# Patient Record
Sex: MALE | Race: WHITE | NOT HISPANIC OR LATINO
[De-identification: names, ages, dates, MRNs, and addresses within clinical notes are randomized per-mention and may not be internally consistent; named-entity substitution may affect disease eponyms.]

---

## 2017-04-25 ENCOUNTER — RX RENEWAL (OUTPATIENT)
Age: 77
End: 2017-04-25

## 2017-05-18 ENCOUNTER — APPOINTMENT (OUTPATIENT)
Dept: HEART AND VASCULAR | Facility: CLINIC | Age: 77
End: 2017-05-18

## 2017-05-18 VITALS
BODY MASS INDEX: 24.62 KG/M2 | OXYGEN SATURATION: 95 % | DIASTOLIC BLOOD PRESSURE: 64 MMHG | HEART RATE: 72 BPM | HEIGHT: 70 IN | WEIGHT: 172 LBS | SYSTOLIC BLOOD PRESSURE: 120 MMHG

## 2017-05-19 LAB
25(OH)D3 SERPL-MCNC: 44.1 NG/ML
ALBUMIN SERPL ELPH-MCNC: 4.5 G/DL
ALP BLD-CCNC: 67 U/L
ALT SERPL-CCNC: 35 U/L
ANION GAP SERPL CALC-SCNC: 16 MMOL/L
AST SERPL-CCNC: 34 U/L
BASOPHILS # BLD AUTO: 0.03 K/UL
BASOPHILS NFR BLD AUTO: 0.5 %
BILIRUB SERPL-MCNC: 0.8 MG/DL
BUN SERPL-MCNC: 19 MG/DL
CALCIUM SERPL-MCNC: 9.3 MG/DL
CHLORIDE SERPL-SCNC: 103 MMOL/L
CHOLEST SERPL-MCNC: 173 MG/DL
CHOLEST/HDLC SERPL: 3.4 RATIO
CO2 SERPL-SCNC: 22 MMOL/L
CREAT SERPL-MCNC: 0.93 MG/DL
CRP SERPL HS-MCNC: 0.5 MG/L
EOSINOPHIL # BLD AUTO: 0.2 K/UL
EOSINOPHIL NFR BLD AUTO: 3.4 %
GLUCOSE SERPL-MCNC: 93 MG/DL
HBA1C MFR BLD HPLC: 5.3 %
HCT VFR BLD CALC: 46.9 %
HCYS SERPL-MCNC: 9.4 UMOL/L
HDLC SERPL-MCNC: 51 MG/DL
HGB BLD-MCNC: 15.1 G/DL
IMM GRANULOCYTES NFR BLD AUTO: 0.2 %
LDLC SERPL CALC-MCNC: 107 MG/DL
LYMPHOCYTES # BLD AUTO: 1.76 K/UL
LYMPHOCYTES NFR BLD AUTO: 29.5 %
MAN DIFF?: NORMAL
MCHC RBC-ENTMCNC: 30.3 PG
MCHC RBC-ENTMCNC: 32.2 GM/DL
MCV RBC AUTO: 94.2 FL
MONOCYTES # BLD AUTO: 0.46 K/UL
MONOCYTES NFR BLD AUTO: 7.7 %
NEUTROPHILS # BLD AUTO: 3.5 K/UL
NEUTROPHILS NFR BLD AUTO: 58.7 %
PLATELET # BLD AUTO: 224 K/UL
POTASSIUM SERPL-SCNC: 4.4 MMOL/L
PROT SERPL-MCNC: 6.5 G/DL
PSA SERPL-MCNC: 1.45 NG/ML
RBC # BLD: 4.98 M/UL
RBC # FLD: 14.2 %
SODIUM SERPL-SCNC: 141 MMOL/L
TRIGL SERPL-MCNC: 74 MG/DL
TSH SERPL-ACNC: 4.01 UIU/ML
WBC # FLD AUTO: 5.96 K/UL

## 2017-08-14 ENCOUNTER — RX RENEWAL (OUTPATIENT)
Age: 77
End: 2017-08-14

## 2017-09-18 ENCOUNTER — APPOINTMENT (OUTPATIENT)
Dept: HEART AND VASCULAR | Facility: CLINIC | Age: 77
End: 2017-09-18
Payer: MEDICARE

## 2017-09-18 VITALS
TEMPERATURE: 98.2 F | OXYGEN SATURATION: 98 % | WEIGHT: 178 LBS | HEIGHT: 70 IN | RESPIRATION RATE: 14 BRPM | BODY MASS INDEX: 25.48 KG/M2 | HEART RATE: 59 BPM | SYSTOLIC BLOOD PRESSURE: 130 MMHG | DIASTOLIC BLOOD PRESSURE: 70 MMHG

## 2017-09-18 PROCEDURE — 93015 CV STRESS TEST SUPVJ I&R: CPT

## 2017-09-18 PROCEDURE — 99215 OFFICE O/P EST HI 40 MIN: CPT | Mod: 25

## 2017-09-18 PROCEDURE — 93306 TTE W/DOPPLER COMPLETE: CPT | Mod: XE

## 2017-09-18 PROCEDURE — 93880 EXTRACRANIAL BILAT STUDY: CPT | Mod: XE

## 2017-11-07 ENCOUNTER — RX RENEWAL (OUTPATIENT)
Age: 77
End: 2017-11-07

## 2018-01-31 ENCOUNTER — RX RENEWAL (OUTPATIENT)
Age: 78
End: 2018-01-31

## 2018-02-02 ENCOUNTER — RX RENEWAL (OUTPATIENT)
Age: 78
End: 2018-02-02

## 2018-05-07 ENCOUNTER — RX RENEWAL (OUTPATIENT)
Age: 78
End: 2018-05-07

## 2018-05-15 ENCOUNTER — RX RENEWAL (OUTPATIENT)
Age: 78
End: 2018-05-15

## 2018-07-31 ENCOUNTER — RX RENEWAL (OUTPATIENT)
Age: 78
End: 2018-07-31

## 2018-08-01 ENCOUNTER — RX RENEWAL (OUTPATIENT)
Age: 78
End: 2018-08-01

## 2018-10-10 ENCOUNTER — APPOINTMENT (OUTPATIENT)
Dept: HEART AND VASCULAR | Facility: CLINIC | Age: 78
End: 2018-10-10

## 2018-10-10 VITALS
TEMPERATURE: 98.7 F | HEART RATE: 66 BPM | HEIGHT: 70 IN | BODY MASS INDEX: 24.34 KG/M2 | RESPIRATION RATE: 14 BRPM | SYSTOLIC BLOOD PRESSURE: 114 MMHG | DIASTOLIC BLOOD PRESSURE: 70 MMHG | WEIGHT: 170 LBS | OXYGEN SATURATION: 95 %

## 2018-10-29 ENCOUNTER — RX RENEWAL (OUTPATIENT)
Age: 78
End: 2018-10-29

## 2018-12-28 ENCOUNTER — RX RENEWAL (OUTPATIENT)
Age: 78
End: 2018-12-28

## 2019-01-29 ENCOUNTER — MESSAGE (OUTPATIENT)
Age: 79
End: 2019-01-29

## 2019-01-30 ENCOUNTER — RX RENEWAL (OUTPATIENT)
Age: 79
End: 2019-01-30

## 2019-03-20 ENCOUNTER — APPOINTMENT (OUTPATIENT)
Dept: HEART AND VASCULAR | Facility: CLINIC | Age: 79
End: 2019-03-20

## 2019-03-27 ENCOUNTER — APPOINTMENT (OUTPATIENT)
Dept: HEART AND VASCULAR | Facility: CLINIC | Age: 79
End: 2019-03-27
Payer: MEDICARE

## 2019-03-27 VITALS
HEART RATE: 67 BPM | WEIGHT: 172.8 LBS | HEIGHT: 70 IN | OXYGEN SATURATION: 97 % | DIASTOLIC BLOOD PRESSURE: 60 MMHG | RESPIRATION RATE: 14 BRPM | SYSTOLIC BLOOD PRESSURE: 105 MMHG | BODY MASS INDEX: 24.74 KG/M2

## 2019-03-27 PROCEDURE — 99214 OFFICE O/P EST MOD 30 MIN: CPT

## 2019-03-27 PROCEDURE — 93306 TTE W/DOPPLER COMPLETE: CPT

## 2019-03-27 PROCEDURE — 93880 EXTRACRANIAL BILAT STUDY: CPT

## 2019-03-27 PROCEDURE — 93000 ELECTROCARDIOGRAM COMPLETE: CPT

## 2019-03-27 RX ORDER — AMLODIPINE BESYLATE 2.5 MG/1
2.5 TABLET ORAL
Refills: 0 | Status: DISCONTINUED | COMMUNITY
End: 2019-03-27

## 2019-03-27 RX ORDER — ALFUZOSIN HYDROCHLORIDE 10 MG/1
10 TABLET, EXTENDED RELEASE ORAL DAILY
Qty: 1 | Refills: 3 | Status: DISCONTINUED | COMMUNITY
End: 2019-03-27

## 2019-03-27 RX ORDER — AMLODIPINE BESYLATE 10 MG/1
10 TABLET ORAL
Qty: 90 | Refills: 1 | Status: DISCONTINUED | COMMUNITY
Start: 2018-05-15 | End: 2019-03-27

## 2019-03-27 NOTE — DISCUSSION/SUMMARY
[___ Month(s)] : [unfilled] month(s) [With Me] : with me [FreeTextEntry3] : fasting lipids, cbc and treadmill stress [FreeTextEntry1] : results and labs discussed\par hgb likely low due to recent blood donation - repeat in few months\par lipiids up- restart pravastatin\par

## 2019-03-27 NOTE — HISTORY OF PRESENT ILLNESS
[FreeTextEntry1] : feels well. but not exercising as much\par No palps. \par trigeminal neuralgia improved\par stopped amlodapine due to lightheaded and low bp, better now\par c/o urinary frequency- prostate related\par no angina or dyspnea

## 2019-03-27 NOTE — PHYSICAL EXAM
[General Appearance - Well Developed] : well developed [Normal Appearance] : normal appearance [Well Groomed] : well groomed [General Appearance - Well Nourished] : well nourished [No Deformities] : no deformities [General Appearance - In No Acute Distress] : no acute distress [Normal Conjunctiva] : the conjunctiva exhibited no abnormalities [Eyelids - No Xanthelasma] : the eyelids demonstrated no xanthelasmas [Normal Oral Mucosa] : normal oral mucosa [No Oral Pallor] : no oral pallor [No Oral Cyanosis] : no oral cyanosis [Normal Jugular Venous A Waves Present] : normal jugular venous A waves present [Normal Jugular Venous V Waves Present] : normal jugular venous V waves present [No Jugular Venous Luu A Waves] : no jugular venous luu A waves [Respiration, Rhythm And Depth] : normal respiratory rhythm and effort [Exaggerated Use Of Accessory Muscles For Inspiration] : no accessory muscle use [Auscultation Breath Sounds / Voice Sounds] : lungs were clear to auscultation bilaterally [Heart Rate And Rhythm] : heart rate and rhythm were normal [Heart Sounds] : normal S1 and S2 [Murmurs] : no murmurs present [Abdomen Soft] : soft [Abdomen Tenderness] : non-tender [Abdomen Mass (___ Cm)] : no abdominal mass palpated [Abnormal Walk] : normal gait [Gait - Sufficient For Exercise Testing] : the gait was sufficient for exercise testing [Nail Clubbing] : no clubbing of the fingernails [Cyanosis, Localized] : no localized cyanosis [Petechial Hemorrhages (___cm)] : no petechial hemorrhages [Skin Color & Pigmentation] : normal skin color and pigmentation [] : no rash [No Venous Stasis] : no venous stasis [Skin Lesions] : no skin lesions [No Skin Ulcers] : no skin ulcer [No Xanthoma] : no  xanthoma was observed [Oriented To Time, Place, And Person] : oriented to person, place, and time [Affect] : the affect was normal [Mood] : the mood was normal [No Anxiety] : not feeling anxious [FreeTextEntry1] : 1-2/6 cornell

## 2019-04-15 ENCOUNTER — APPOINTMENT (OUTPATIENT)
Dept: INTERNAL MEDICINE | Facility: CLINIC | Age: 79
End: 2019-04-15
Payer: MEDICARE

## 2019-04-15 VITALS
RESPIRATION RATE: 14 BRPM | DIASTOLIC BLOOD PRESSURE: 60 MMHG | WEIGHT: 173 LBS | SYSTOLIC BLOOD PRESSURE: 110 MMHG | HEART RATE: 62 BPM | TEMPERATURE: 98.8 F | OXYGEN SATURATION: 96 % | BODY MASS INDEX: 24.82 KG/M2

## 2019-04-15 PROCEDURE — 99214 OFFICE O/P EST MOD 30 MIN: CPT

## 2019-04-18 ENCOUNTER — FORM ENCOUNTER (OUTPATIENT)
Age: 79
End: 2019-04-18

## 2019-04-19 ENCOUNTER — OUTPATIENT (OUTPATIENT)
Dept: OUTPATIENT SERVICES | Facility: HOSPITAL | Age: 79
LOS: 1 days | End: 2019-04-19
Payer: MEDICARE

## 2019-04-19 ENCOUNTER — APPOINTMENT (OUTPATIENT)
Dept: RADIOLOGY | Facility: CLINIC | Age: 79
End: 2019-04-19

## 2019-04-19 ENCOUNTER — APPOINTMENT (OUTPATIENT)
Dept: PHYSICAL MEDICINE AND REHAB | Facility: CLINIC | Age: 79
End: 2019-04-19
Payer: MEDICARE

## 2019-04-19 VITALS
OXYGEN SATURATION: 98 % | BODY MASS INDEX: 24.77 KG/M2 | HEART RATE: 67 BPM | HEIGHT: 70 IN | WEIGHT: 173 LBS | RESPIRATION RATE: 16 BRPM

## 2019-04-19 DIAGNOSIS — M19.012 PRIMARY OSTEOARTHRITIS, LEFT SHOULDER: ICD-10-CM

## 2019-04-19 PROCEDURE — 73030 X-RAY EXAM OF SHOULDER: CPT

## 2019-04-19 PROCEDURE — 99203 OFFICE O/P NEW LOW 30 MIN: CPT

## 2019-04-19 PROCEDURE — 73030 X-RAY EXAM OF SHOULDER: CPT | Mod: 26,LT

## 2019-04-21 NOTE — HISTORY OF PRESENT ILLNESS
[FreeTextEntry1] : New doctor visit [de-identified] : THis is a 79 yo M - patient of Dr Cartwright- needs PMD now.  Former patient of Dr Qiuncy Lockett. \par Hx of aaa, remains stable., MRm HTN. HLD\par Eats well but does not exercise a lot right now (plans to get back into it)\par BPH - saw Dr Griffin - prostate mildly enlarged.  Uroxatral tried it but bp went low.  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  \par Hx of left shoulder pain - surgery at [facility] years ago - Pain in bed lying on side.  \par Seasonal allergies -worse at night. Had seen ENT Dr Pena- advised to stop afrin in the past.  SHe rec a procedure - seeing her tomorrow\par Tearing in left eye - Sw optho Dr Durand seen 2 weeks ago - given drops....did not help.

## 2019-04-21 NOTE — PHYSICAL EXAM
[de-identified] : slight watering left eye, no erthema [de-identified] : 2mm round brownflat spotright finger

## 2019-04-22 ENCOUNTER — MESSAGE (OUTPATIENT)
Age: 79
End: 2019-04-22

## 2019-05-14 ENCOUNTER — RX RENEWAL (OUTPATIENT)
Age: 79
End: 2019-05-14

## 2019-06-24 ENCOUNTER — RX RENEWAL (OUTPATIENT)
Age: 79
End: 2019-06-24

## 2019-06-25 ENCOUNTER — APPOINTMENT (OUTPATIENT)
Dept: NEUROLOGY | Facility: CLINIC | Age: 79
End: 2019-06-25
Payer: MEDICARE

## 2019-06-25 VITALS
HEART RATE: 75 BPM | WEIGHT: 175 LBS | BODY MASS INDEX: 25.05 KG/M2 | SYSTOLIC BLOOD PRESSURE: 102 MMHG | DIASTOLIC BLOOD PRESSURE: 67 MMHG | HEIGHT: 70 IN | OXYGEN SATURATION: 96 %

## 2019-06-25 PROCEDURE — 99204 OFFICE O/P NEW MOD 45 MIN: CPT

## 2019-06-25 NOTE — PHYSICAL EXAM
[FreeTextEntry1] : Exam:\par Cards- RRR, distal pulses intact \par Gen- awake, alert, NAD\par MS- oriented x3, able to follow commands, speech fluent and nondysarthric\par CN- PERRL, EOMI without nystag, face symmetrical, v1-3 intact, tongue and uvula midline, shoulder shrug intact\par M- R,L (5,5)\par UE: Shoulder abduction (5,4)- chronic L shoulder issue with prior surgery and known OA\par Elbow flexion (5,5)\par Elbow extension (5,5)\par Wrist flexion (5,5)\par Wrist extension (5,5)\par  (5,5)\par LE:\par Hip flexion (5,5)\par Knee flexion (5,5)\par Knee extension (5,5)\par Dorsiflexion (5,5)\par Plantarflexion (5,5)\par Reflexes: R, L (2+, 2+)\par Biceps: (2+, 2+)\par Triceps (2+, 2+)\par BR (2+, 2+)\par Patellars (2+, 2+)\par AJ (1+, 1+)\par Toes: downgoing bilat\par Sensory:\par LT intact bilat\par Coord- FTN intact bilat, finger tapping symmetrical\par Gait- narrow based, steady gait\par \par \par

## 2019-06-25 NOTE — DISCUSSION/SUMMARY
[FreeTextEntry1] : L facial tingling and itching possibly trigeminal neuralgia with reportedly normal workup in the past.  Symptoms have nearly if not completely resolved, although he remains on gabapentin\par \par Advised him to taper off gabapentin over next 3 weeks.  If symptoms return, then he can return back to current dose 600-900-900 and follow up in clinic with Dr. Burk, Dr. Crystal or Dr. Wetzel.  If they do not return then he does not need to follow up\par \par

## 2019-06-25 NOTE — HISTORY OF PRESENT ILLNESS
[FreeTextEntry1] : 78 year old M, referred by Dr. Jay for L facial tingling and itching involving the ear, cheek and into the L neck, as per records was given dx of "trigeminal neuralgia". Symptoms started about 3 years ago.  Was started on gabapentin which provided relief.  Symptoms have mostly resolved over a year ago.  Occasional gets itching of the neck but isnt sure if its related.  Reports 95+ percent improvement.  However remains on gabapentin and would like to try to stop it.  On gabapentin 600-900-900 daily.  \par \par Reports having had MR brain and c spine which was nondiagnostic, ordered by his prior neurologist Dr. Kenney.  Records not available\par \par PMHx\par mitral insuff\par carotid atherosclerosis\par ascending aortic aneurysm, stable for 20 years\par HLD\par HTN\par tinnitus since '84\par OA L shoulder\par \par Social Hx\par never smoked\par no alc since '84\par no drugs\par \par FHx\par father- HTN, strokes\par mother- HTN\par \par ROS- negative except as listed in HPI\par \par \par \par

## 2019-07-24 ENCOUNTER — RX RENEWAL (OUTPATIENT)
Age: 79
End: 2019-07-24

## 2019-08-16 ENCOUNTER — RX RENEWAL (OUTPATIENT)
Age: 79
End: 2019-08-16

## 2019-09-18 ENCOUNTER — RX RENEWAL (OUTPATIENT)
Age: 79
End: 2019-09-18

## 2019-10-04 ENCOUNTER — EMERGENCY (EMERGENCY)
Facility: HOSPITAL | Age: 79
LOS: 1 days | Discharge: HOME | End: 2019-10-04
Attending: EMERGENCY MEDICINE | Admitting: EMERGENCY MEDICINE
Payer: MEDICARE

## 2019-10-04 VITALS
WEIGHT: 119.93 LBS | RESPIRATION RATE: 18 BRPM | HEART RATE: 63 BPM | OXYGEN SATURATION: 98 % | TEMPERATURE: 98 F | DIASTOLIC BLOOD PRESSURE: 65 MMHG | HEIGHT: 70 IN | SYSTOLIC BLOOD PRESSURE: 134 MMHG

## 2019-10-04 VITALS
RESPIRATION RATE: 18 BRPM | DIASTOLIC BLOOD PRESSURE: 76 MMHG | OXYGEN SATURATION: 96 % | TEMPERATURE: 98 F | SYSTOLIC BLOOD PRESSURE: 143 MMHG | HEART RATE: 46 BPM

## 2019-10-04 PROCEDURE — 99284 EMERGENCY DEPT VISIT MOD MDM: CPT | Mod: 25

## 2019-10-04 PROCEDURE — 36415 COLL VENOUS BLD VENIPUNCTURE: CPT

## 2019-10-04 PROCEDURE — 99285 EMERGENCY DEPT VISIT HI MDM: CPT

## 2019-10-04 PROCEDURE — 71045 X-RAY EXAM CHEST 1 VIEW: CPT | Mod: 26

## 2019-10-04 PROCEDURE — 80053 COMPREHEN METABOLIC PANEL: CPT

## 2019-10-04 PROCEDURE — 85730 THROMBOPLASTIN TIME PARTIAL: CPT

## 2019-10-04 PROCEDURE — 85025 COMPLETE CBC W/AUTO DIFF WBC: CPT

## 2019-10-04 PROCEDURE — 71045 X-RAY EXAM CHEST 1 VIEW: CPT

## 2019-10-04 PROCEDURE — 93005 ELECTROCARDIOGRAM TRACING: CPT | Mod: 76

## 2019-10-04 PROCEDURE — 82553 CREATINE MB FRACTION: CPT

## 2019-10-04 PROCEDURE — 82550 ASSAY OF CK (CPK): CPT

## 2019-10-04 PROCEDURE — 84484 ASSAY OF TROPONIN QUANT: CPT

## 2019-10-04 PROCEDURE — 85610 PROTHROMBIN TIME: CPT

## 2019-10-04 PROCEDURE — 93010 ELECTROCARDIOGRAM REPORT: CPT

## 2019-10-04 NOTE — ED PROVIDER NOTE - PHYSICAL EXAMINATION
Constitutional: Well appearing, well nourished, awake, alert, oriented to person, place, time/situation and in no apparent distress.  ENMT: Airway patent. Normal MM  Eyes: Clear bilaterally  Cardiac: Normal rate, regular rhythm.  Heart sounds S1, S2.  No murmurs, rubs or gallops. No JVD or LE edema  Respiratory: Breaths sounds equal and clear b/l. No W/R/R. No increased WOB, tachypnea, hypoxia, or accessory mm use. Pt speaks in full sentences.   Gastrointestinal: Abd soft, NT, ND, NABS. No guarding, rebound, or rigidity. No pulsatile abdominal masses. No organomegaly appreciated. No CVAT   Musculoskeletal: Range of motion is not limited  Neuro: Alert and oriented x 3, face symmetric and speech fluent. Strength 5/5 x 4 ext and symmetric, nml gross motor movement, nml gait. No focal deficits noted.  Skin: Skin normal color for race, warm, dry and intact. No evidence of rash.  Psych: Alert and oriented to person, place, time/situation. normal mood and affect. no apparent risk to self or others.

## 2019-10-04 NOTE — ED ADULT NURSE NOTE - CHPI ED NUR SYMPTOMS NEG
no diaphoresis/no shortness of breath/no fever/no chest pain/no back pain/no nausea/no syncope/no chills/no dizziness/no congestion/no vomiting

## 2019-10-04 NOTE — ED ADULT NURSE NOTE - OBJECTIVE STATEMENT
PT sent to ED by PMD for irregular heartbeat. Pt has hx of mitral valve regurgitation and desecending AA.  PT states he has intermittent tachycardia and palpitations.  PT denies chest pain, SOB, abd pain, /GI symptoms, D/N/V, fever or chills.   PT speaking in complete, clear sentences, alert and oriented x3.

## 2019-10-04 NOTE — ED PROVIDER NOTE - PROGRESS NOTE DETAILS
D/w Dr Velazco, covering Dr Cartwright. Pt can call the office on Monday for Holter monitoring. Return precautions given. Will d/c

## 2019-10-04 NOTE — ED PROVIDER NOTE - NSFOLLOWUPINSTRUCTIONS_ED_ALL_ED_FT
You were evaluated in the ED for palpitations. Your blood work, EKG, and xray of the chest did not reveal any acute abnormalities. You were monitoring in the ED without arrhythmia. Your heart remained in the 50s most of the visit. Please call the office of your cardiologist on Monday to set you on for Holter monitoring. Return for high heart rates > 100, or low < 40, and /or symptoms of chest pain, shortness of breath, feeling faint with the palpitations.     Palpitations    A palpitation is the feeling that your heartbeat is irregular or is faster than normal. It may feel like your heart is fluttering or skipping a beat. They may be caused by many things, including smoking, caffeine, alcohol, stress, and certain medicines. Although most causes of palpitations are not serious, palpitations can be a sign of a serious medical problem. Avoid caffeine, alcohol, and tobacco products at home. Try to reduce stress and anxiety and make sure to get plenty of rest.     SEEK IMMEDIATE MEDICAL CARE IF YOU HAVE ANY OF THE FOLLOWING SYMPTOMS: chest pain, shortness of breath, severe headache, dizziness/lightheadedness, or fainting.

## 2019-10-04 NOTE — ED ADULT TRIAGE NOTE - ARRIVAL INFO ADDITIONAL COMMENTS
states "I feel like my heart is skipping for past hour". states he has hx of PVC. denies any chest pain, cough, sob, fever/chills

## 2019-10-04 NOTE — ED PROVIDER NOTE - OBJECTIVE STATEMENT
Pt w/ PMHx mitral valve regurg, HTN, BPH, palpitations, stable aortic aneurysm @ 4.5 cm, p/w intermittent palpitations x days. Pt reports the other day he had approx 15 min of palpitations, described as "tachycardia." Sx were self limited and resolved. Today, since 3 pm, pt has had several episodes of palpitations described as extra beats. Pt reports he normally has PACs, and can feel them at times, but this week more profound. No associated CP, SOB, diaphoresis, lightheadedness, or syncope. Since being in the ED, pt states he feels "back to normal." Cardiologist is Dr Cartwright

## 2019-10-04 NOTE — ED PROVIDER NOTE - CLINICAL SUMMARY MEDICAL DECISION MAKING FREE TEXT BOX
Pt p/w intermittent palpitations, improved on arrival. No associated sx. SB w/ occasional PACs on EKG. DDx includes but not limited to dehydration, electrolyte abnormalities, anemia, paroxsymal arrhythmia, thyroid dysfunction, other pathology. Monitor in ED. Check labs, CXR. If no arrhythmia and w/u stable, anticipate d/c w/ f/u cardio for Holter monitoring

## 2019-10-04 NOTE — ED PROVIDER NOTE - PATIENT PORTAL LINK FT
You can access the FollowMyHealth Patient Portal offered by [facility] by registering at the following website: [email or web address]. By joining [facility]’s FollowMyHealth portal, you will also be able to view your health information using other applications (apps) compatible with our system.

## 2019-10-08 ENCOUNTER — APPOINTMENT (OUTPATIENT)
Dept: HEART AND VASCULAR | Facility: CLINIC | Age: 79
End: 2019-10-08
Payer: MEDICARE

## 2019-10-08 VITALS
DIASTOLIC BLOOD PRESSURE: 58 MMHG | WEIGHT: 175 LBS | RESPIRATION RATE: 16 BRPM | SYSTOLIC BLOOD PRESSURE: 116 MMHG | HEART RATE: 60 BPM | OXYGEN SATURATION: 97 % | BODY MASS INDEX: 25.05 KG/M2 | TEMPERATURE: 97.9 F | HEIGHT: 70 IN

## 2019-10-08 PROBLEM — I10 ESSENTIAL (PRIMARY) HYPERTENSION: Chronic | Status: ACTIVE | Noted: 2019-10-04

## 2019-10-08 PROBLEM — I34.0 NONRHEUMATIC MITRAL (VALVE) INSUFFICIENCY: Chronic | Status: ACTIVE | Noted: 2019-10-04

## 2019-10-08 PROBLEM — I71.9 AORTIC ANEURYSM OF UNSPECIFIED SITE, WITHOUT RUPTURE: Chronic | Status: ACTIVE | Noted: 2019-10-04

## 2019-10-08 PROCEDURE — 99213 OFFICE O/P EST LOW 20 MIN: CPT

## 2019-10-08 PROCEDURE — 93225 XTRNL ECG REC<48 HRS REC: CPT

## 2019-10-08 NOTE — PHYSICAL EXAM
[General Appearance - Well Developed] : well developed [Normal Appearance] : normal appearance [Well Groomed] : well groomed [General Appearance - Well Nourished] : well nourished [No Deformities] : no deformities [General Appearance - In No Acute Distress] : no acute distress [Abnormal Walk] : normal gait [Oriented To Time, Place, And Person] : oriented to person, place, and time [Affect] : the affect was normal [Mood] : the mood was normal [No Anxiety] : not feeling anxious

## 2019-10-08 NOTE — HISTORY OF PRESENT ILLNESS
[FreeTextEntry1] : 79 year male who comes for Holter hookup after ER visit. He was evaluated for palpitations but denies having syncope or near syncope. He is concerned about A Fib

## 2019-10-11 ENCOUNTER — NON-APPOINTMENT (OUTPATIENT)
Age: 79
End: 2019-10-11

## 2019-10-11 DIAGNOSIS — R00.2 PALPITATIONS: ICD-10-CM

## 2019-10-16 ENCOUNTER — MESSAGE (OUTPATIENT)
Age: 79
End: 2019-10-16

## 2019-10-19 ENCOUNTER — EMERGENCY (EMERGENCY)
Facility: HOSPITAL | Age: 79
LOS: 1 days | Discharge: HOME | End: 2019-10-19
Attending: EMERGENCY MEDICINE | Admitting: EMERGENCY MEDICINE
Payer: MEDICARE

## 2019-10-19 VITALS
RESPIRATION RATE: 18 BRPM | OXYGEN SATURATION: 97 % | TEMPERATURE: 98 F | HEART RATE: 62 BPM | SYSTOLIC BLOOD PRESSURE: 139 MMHG | DIASTOLIC BLOOD PRESSURE: 60 MMHG

## 2019-10-19 VITALS
OXYGEN SATURATION: 97 % | SYSTOLIC BLOOD PRESSURE: 146 MMHG | TEMPERATURE: 99 F | WEIGHT: 169.98 LBS | HEART RATE: 66 BPM | DIASTOLIC BLOOD PRESSURE: 57 MMHG | HEIGHT: 70 IN | RESPIRATION RATE: 16 BRPM

## 2019-10-19 LAB
ALBUMIN SERPL ELPH-MCNC: 4.4 G/DL — SIGNIFICANT CHANGE UP (ref 3.3–5)
ALP SERPL-CCNC: 59 U/L — SIGNIFICANT CHANGE UP (ref 40–120)
ALT FLD-CCNC: 22 U/L — SIGNIFICANT CHANGE UP (ref 10–45)
ANION GAP SERPL CALC-SCNC: 10 MMOL/L — SIGNIFICANT CHANGE UP (ref 5–17)
APTT BLD: 34.2 SEC — SIGNIFICANT CHANGE UP (ref 27.5–36.3)
AST SERPL-CCNC: 26 U/L — SIGNIFICANT CHANGE UP (ref 10–40)
BASOPHILS # BLD AUTO: 0.03 K/UL — SIGNIFICANT CHANGE UP (ref 0–0.2)
BASOPHILS NFR BLD AUTO: 0.6 % — SIGNIFICANT CHANGE UP (ref 0–2)
BILIRUB SERPL-MCNC: 0.3 MG/DL — SIGNIFICANT CHANGE UP (ref 0.2–1.2)
BUN SERPL-MCNC: 13 MG/DL — SIGNIFICANT CHANGE UP (ref 7–23)
CALCIUM SERPL-MCNC: 9.5 MG/DL — SIGNIFICANT CHANGE UP (ref 8.4–10.5)
CHLORIDE SERPL-SCNC: 100 MMOL/L — SIGNIFICANT CHANGE UP (ref 96–108)
CO2 SERPL-SCNC: 29 MMOL/L — SIGNIFICANT CHANGE UP (ref 22–31)
CREAT SERPL-MCNC: 0.75 MG/DL — SIGNIFICANT CHANGE UP (ref 0.5–1.3)
EOSINOPHIL # BLD AUTO: 0.11 K/UL — SIGNIFICANT CHANGE UP (ref 0–0.5)
EOSINOPHIL NFR BLD AUTO: 2.1 % — SIGNIFICANT CHANGE UP (ref 0–6)
GLUCOSE SERPL-MCNC: 101 MG/DL — HIGH (ref 70–99)
HCT VFR BLD CALC: 36.9 % — LOW (ref 39–50)
HGB BLD-MCNC: 11.6 G/DL — LOW (ref 13–17)
IMM GRANULOCYTES NFR BLD AUTO: 0.2 % — SIGNIFICANT CHANGE UP (ref 0–1.5)
INR BLD: 1.14 — SIGNIFICANT CHANGE UP (ref 0.88–1.16)
LYMPHOCYTES # BLD AUTO: 1.03 K/UL — SIGNIFICANT CHANGE UP (ref 1–3.3)
LYMPHOCYTES # BLD AUTO: 19.9 % — SIGNIFICANT CHANGE UP (ref 13–44)
MCHC RBC-ENTMCNC: 26.8 PG — LOW (ref 27–34)
MCHC RBC-ENTMCNC: 31.4 GM/DL — LOW (ref 32–36)
MCV RBC AUTO: 85.2 FL — SIGNIFICANT CHANGE UP (ref 80–100)
MONOCYTES # BLD AUTO: 0.39 K/UL — SIGNIFICANT CHANGE UP (ref 0–0.9)
MONOCYTES NFR BLD AUTO: 7.5 % — SIGNIFICANT CHANGE UP (ref 2–14)
NEUTROPHILS # BLD AUTO: 3.61 K/UL — SIGNIFICANT CHANGE UP (ref 1.8–7.4)
NEUTROPHILS NFR BLD AUTO: 69.7 % — SIGNIFICANT CHANGE UP (ref 43–77)
NRBC # BLD: 0 /100 WBCS — SIGNIFICANT CHANGE UP (ref 0–0)
NT-PROBNP SERPL-SCNC: 156 PG/ML — SIGNIFICANT CHANGE UP (ref 0–300)
PLATELET # BLD AUTO: 198 K/UL — SIGNIFICANT CHANGE UP (ref 150–400)
POTASSIUM SERPL-MCNC: 4.2 MMOL/L — SIGNIFICANT CHANGE UP (ref 3.5–5.3)
POTASSIUM SERPL-SCNC: 4.2 MMOL/L — SIGNIFICANT CHANGE UP (ref 3.5–5.3)
PROT SERPL-MCNC: 6.7 G/DL — SIGNIFICANT CHANGE UP (ref 6–8.3)
PROTHROM AB SERPL-ACNC: 12.9 SEC — SIGNIFICANT CHANGE UP (ref 10–12.9)
RBC # BLD: 4.33 M/UL — SIGNIFICANT CHANGE UP (ref 4.2–5.8)
RBC # FLD: 14.8 % — HIGH (ref 10.3–14.5)
SODIUM SERPL-SCNC: 139 MMOL/L — SIGNIFICANT CHANGE UP (ref 135–145)
TROPONIN T SERPL-MCNC: <0.01 NG/ML — SIGNIFICANT CHANGE UP (ref 0–0.01)
WBC # BLD: 5.18 K/UL — SIGNIFICANT CHANGE UP (ref 3.8–10.5)
WBC # FLD AUTO: 5.18 K/UL — SIGNIFICANT CHANGE UP (ref 3.8–10.5)

## 2019-10-19 PROCEDURE — 84484 ASSAY OF TROPONIN QUANT: CPT

## 2019-10-19 PROCEDURE — 71045 X-RAY EXAM CHEST 1 VIEW: CPT | Mod: 26

## 2019-10-19 PROCEDURE — 83880 ASSAY OF NATRIURETIC PEPTIDE: CPT

## 2019-10-19 PROCEDURE — 80053 COMPREHEN METABOLIC PANEL: CPT

## 2019-10-19 PROCEDURE — 85610 PROTHROMBIN TIME: CPT

## 2019-10-19 PROCEDURE — 36415 COLL VENOUS BLD VENIPUNCTURE: CPT

## 2019-10-19 PROCEDURE — 99284 EMERGENCY DEPT VISIT MOD MDM: CPT | Mod: 25

## 2019-10-19 PROCEDURE — 85730 THROMBOPLASTIN TIME PARTIAL: CPT

## 2019-10-19 PROCEDURE — 99285 EMERGENCY DEPT VISIT HI MDM: CPT | Mod: 25

## 2019-10-19 PROCEDURE — 71045 X-RAY EXAM CHEST 1 VIEW: CPT

## 2019-10-19 PROCEDURE — 85025 COMPLETE CBC W/AUTO DIFF WBC: CPT

## 2019-10-19 RX ORDER — AMLODIPINE BESYLATE 2.5 MG/1
1 TABLET ORAL
Qty: 0 | Refills: 0 | DISCHARGE

## 2019-10-19 RX ORDER — ATORVASTATIN CALCIUM 80 MG/1
1 TABLET, FILM COATED ORAL
Qty: 0 | Refills: 0 | DISCHARGE

## 2019-10-19 RX ORDER — GABAPENTIN 400 MG/1
0 CAPSULE ORAL
Qty: 0 | Refills: 0 | DISCHARGE

## 2019-10-19 RX ORDER — TAMSULOSIN HYDROCHLORIDE 0.4 MG/1
1 CAPSULE ORAL
Qty: 0 | Refills: 0 | DISCHARGE

## 2019-10-19 RX ORDER — TELMISARTAN 20 MG/1
1 TABLET ORAL
Qty: 0 | Refills: 0 | DISCHARGE

## 2019-10-19 RX ORDER — INDAPAMIDE 1.25 MG
1 TABLET ORAL
Qty: 0 | Refills: 0 | DISCHARGE

## 2019-10-19 NOTE — ED PROVIDER NOTE - NSFOLLOWUPINSTRUCTIONS_ED_ALL_ED_FT
Please follow up with your primary physician in 1-2 days for re evaluation.  Please return to ER immediately should your symptoms worsen or if you have any concern prior to this recommended follow up.    Heart Palpitations    WHAT YOU NEED TO KNOW:    Heart palpitations are feelings that your heart races, jumps, throbs, or flutters. You may feel extra beats, no beats for a short time, or skipped beats. You may have these feelings in your chest, throat, or neck. They may happen when you are sitting, standing, or lying. Heart palpitations may be frightening, but are usually not caused by a serious problem.     DISCHARGE INSTRUCTIONS:    Call 911 or have someone else call for any of the following:     You have any of the following signs of a heart attack:   Squeezing, pressure, or pain in your chest      You may also have any of the following:   Discomfort or pain in your back, neck, jaw, stomach, or arm      Shortness of breath      Nausea or vomiting      Lightheadedness or a sudden cold sweat      You have any of the following signs of a stroke:   Numbness or drooping on one side of your face       Weakness in an arm or leg      Confusion or difficulty speaking      Dizziness, a severe headache, or vision loss      You faint or lose consciousness.     Return to the emergency department if:     Your palpitations happen more often or get more intense.         Contact your healthcare provider if:     You have new or worsening swelling in your feet or ankles.      You have questions or concerns about your condition or care.    Follow up with your healthcare provider as directed: You may need to follow up with a cardiologist. You may need tests to check for heart problems that cause palpitations. Write down your questions so you remember to ask them during your visits.     Keep a record: Write down when your palpitations start and stop, what you were doing when they started, and your symptoms. Keep track of what you ate or drank within a few hours of your palpitations. Include anything that seemed to help your symptoms, such as lying down or holding your breath. This record will help you and your healthcare provider learn what triggers your palpitations. Bring this record with you to your follow up visits.    Help prevent heart palpitations:     Manage stress and anxiety. Find ways to relax such as listening to music, meditating, or doing yoga. Exercise can also help decrease stress and anxiety. Talk to someone you trust about your stress or anxiety. You can also talk to a therapist.       Get plenty of sleep every night. Ask your healthcare provider how much sleep you need each night.       Do not drink caffeine or alcohol. Caffeine and alcohol can make your palpitations worse. Caffeine is found in soda, coffee, tea, chocolate, and drinks that increase your energy.       Do not smoke. Nicotine and other chemicals in cigarettes and cigars may damage your heart and blood vessels. Ask your healthcare provider for information if you currently smoke and need help to quit. E-cigarettes or smokeless tobacco still contain nicotine. Talk to your healthcare provider before you use these products.       Do not use illegal drugs. Talk to your healthcare provider if you use illegal drugs and want help to quit.          © Copyright [employer] 2019 All illustrations and images included in CareNotes are the copyrighted property of [employer] or [employer].      back to top                      © Copyright [employer] 2019

## 2019-10-19 NOTE — ED ADULT NURSE NOTE - CHPI ED NUR SYMPTOMS NEG
no vomiting/no chills/no syncope/no fever/no chest pain/no back pain/no shortness of breath/no diaphoresis/no nausea/no congestion/no dizziness

## 2019-10-19 NOTE — ED PROVIDER NOTE - CLINICAL SUMMARY MEDICAL DECISION MAKING FREE TEXT BOX
Patient presents to ED with concern for pulse irregularity today.  Patient denies palpitations, noting he has experiences pulse irregularity and palpitations multiple times in the past, though it lasted longer today and he has an at home machine that read out "maybe atrial fibrillation," which ultimately prompted him to come to ED for further evaluation.  No CP/SOB, or other symptoms noted.  He is well appearing.  He is followed by Dr. Cartwright who is contacted in ED and agreeable to plan for discharge home, stating he plans to see patient this week.  He has no further ED recommendations at this time given sinus rhythm (sinus arrhythmia) and negative trop, no CP, SOB, etc.  Plan is discussed with patient who is also in agreement.  He is advised to follow up with Dr. Cartwright this week without fail and instructed to return to ED immediately should his symptoms worsen or if he has any concern prior to this recommended follow up.  Patient is aware of plan and verbalizes his understanding.  Will discharge home at this time.

## 2019-10-19 NOTE — ED PROVIDER NOTE - PATIENT PORTAL LINK FT
You can access the FollowMyHealth Patient Portal offered by [facility] by registering at the following website: [email or web address]. By joining [facility]’s FollowMyHealth portal, you will also be able to view your health information using other applications (apps) compatible with our system.

## 2019-10-19 NOTE — ED ADULT TRIAGE NOTE - CHIEF COMPLAINT QUOTE
" I am having irregular heart beat, I was here few weeks ago for the same thing ," Denies any chest pain nor sob .

## 2019-10-19 NOTE — ED PROVIDER NOTE - OBJECTIVE STATEMENT
79 year old male with history of descending aortic aneurysm, HTN, HLD, mitral valve regurgitation, BPH, palpitations presents to ED with concern for pulse irregularity today.  Patient notes irregularity was more prominent than what he typically experiences.  Patient denies feeling palpitations, but rather states he checks his pulse and noted "bigeminy."  He notes he has experienced these symptoms in the past, however states typically the irregularity improves.  He does not feel as though it has improved today, prompting his ED visit.  He denies associated chest pain, shortness of breath, headache, visual changes, fever, chills, abdominal pain, nausea, emesis, peripheral edema or any additional acute complaints or concerns at this time.

## 2019-10-19 NOTE — ED PROVIDER NOTE - DIAGNOSTIC INTERPRETATION
Attending: Radha Au   CXR wet read: The heart appears to be within normal limits in transverse diameter.  No acute infiltrates, effusions or evidence of pulmonary vascular congestion.  The chest wall and surrounding bony structures appear normal.

## 2019-10-19 NOTE — ED ADULT NURSE NOTE - OBJECTIVE STATEMENT
78 yo M presents to ED c/o palpitations. Pt reports doing an "at home ekg with an jeremy recommended by my cardiologist" that said possible atrial fibrillation and pt reports feeling his pulse was,"not regular." Denies any chest pain, SOB, blurred vision, N/V/D, back pain, diaphoresis. Upon assessment pt is AOx3, cap refill < 2 seconds, warm extremities, equal bilateral distal pulses. PT on the monitor. Ekg done.

## 2019-10-21 RX ORDER — AMLODIPINE BESYLATE 5 MG/1
5 TABLET ORAL DAILY
Qty: 90 | Refills: 0 | Status: DISCONTINUED | COMMUNITY
End: 2019-10-21

## 2019-10-22 ENCOUNTER — RX RENEWAL (OUTPATIENT)
Age: 79
End: 2019-10-22

## 2019-10-24 DIAGNOSIS — R00.2 PALPITATIONS: ICD-10-CM

## 2019-10-24 DIAGNOSIS — I49.9 CARDIAC ARRHYTHMIA, UNSPECIFIED: ICD-10-CM

## 2019-10-28 ENCOUNTER — MED ADMIN CHARGE (OUTPATIENT)
Age: 79
End: 2019-10-28

## 2019-10-28 ENCOUNTER — TRANSCRIPTION ENCOUNTER (OUTPATIENT)
Age: 79
End: 2019-10-28

## 2019-10-28 ENCOUNTER — APPOINTMENT (OUTPATIENT)
Dept: HEART AND VASCULAR | Facility: CLINIC | Age: 79
End: 2019-10-28
Payer: MEDICARE

## 2019-10-28 VITALS
DIASTOLIC BLOOD PRESSURE: 60 MMHG | OXYGEN SATURATION: 98 % | SYSTOLIC BLOOD PRESSURE: 100 MMHG | RESPIRATION RATE: 16 BRPM | TEMPERATURE: 98.4 F | HEART RATE: 48 BPM

## 2019-10-28 DIAGNOSIS — D50.9 IRON DEFICIENCY ANEMIA, UNSPECIFIED: ICD-10-CM

## 2019-10-28 PROCEDURE — 93306 TTE W/DOPPLER COMPLETE: CPT

## 2019-10-28 PROCEDURE — 93000 ELECTROCARDIOGRAM COMPLETE: CPT

## 2019-10-28 PROCEDURE — 99215 OFFICE O/P EST HI 40 MIN: CPT

## 2019-10-29 PROBLEM — D50.9 IRON DEFICIENCY ANEMIA: Status: ACTIVE | Noted: 2019-10-29

## 2019-10-29 LAB
ALBUMIN SERPL ELPH-MCNC: 4.4 G/DL
ALP BLD-CCNC: 60 U/L
ALT SERPL-CCNC: 20 U/L
ANION GAP SERPL CALC-SCNC: 14 MMOL/L
AST SERPL-CCNC: 22 U/L
BASOPHILS # BLD AUTO: 0.04 K/UL
BASOPHILS NFR BLD AUTO: 0.6 %
BILIRUB SERPL-MCNC: 0.3 MG/DL
BUN SERPL-MCNC: 12 MG/DL
CALCIUM SERPL-MCNC: 9.4 MG/DL
CHLORIDE SERPL-SCNC: 102 MMOL/L
CHOLEST SERPL-MCNC: 130 MG/DL
CHOLEST/HDLC SERPL: 3.3 RATIO
CO2 SERPL-SCNC: 24 MMOL/L
CREAT SERPL-MCNC: 0.82 MG/DL
EOSINOPHIL # BLD AUTO: 0.1 K/UL
EOSINOPHIL NFR BLD AUTO: 1.4 %
ESTIMATED AVERAGE GLUCOSE: 108 MG/DL
FERRITIN SERPL-MCNC: 10 NG/ML
GLUCOSE SERPL-MCNC: 94 MG/DL
HBA1C MFR BLD HPLC: 5.4 %
HCT VFR BLD CALC: 39.4 %
HDLC SERPL-MCNC: 40 MG/DL
HGB BLD-MCNC: 11.9 G/DL
IMM GRANULOCYTES NFR BLD AUTO: 0.1 %
IRON SATN MFR SERPL: 9 %
IRON SERPL-MCNC: 40 UG/DL
LDLC SERPL CALC-MCNC: 62 MG/DL
LYMPHOCYTES # BLD AUTO: 1.15 K/UL
LYMPHOCYTES NFR BLD AUTO: 15.9 %
MAN DIFF?: NORMAL
MCHC RBC-ENTMCNC: 26.9 PG
MCHC RBC-ENTMCNC: 30.2 GM/DL
MCV RBC AUTO: 88.9 FL
MONOCYTES # BLD AUTO: 0.42 K/UL
MONOCYTES NFR BLD AUTO: 5.8 %
NEUTROPHILS # BLD AUTO: 5.5 K/UL
NEUTROPHILS NFR BLD AUTO: 76.2 %
PLATELET # BLD AUTO: 219 K/UL
POTASSIUM SERPL-SCNC: 4.7 MMOL/L
PROT SERPL-MCNC: 6.3 G/DL
RBC # BLD: 4.43 M/UL
RBC # FLD: 15.5 %
SODIUM SERPL-SCNC: 140 MMOL/L
TIBC SERPL-MCNC: 443 UG/DL
TRANSFERRIN SERPL-MCNC: 358 MG/DL
TRIGL SERPL-MCNC: 139 MG/DL
TSH SERPL-ACNC: 2.49 UIU/ML
UIBC SERPL-MCNC: 403 UG/DL
WBC # FLD AUTO: 7.22 K/UL

## 2019-10-29 NOTE — HISTORY OF PRESENT ILLNESS
[FreeTextEntry1] : feels well. but not exercising as much\par seeen in ER twice in last month w palps and concern he was in AF - No AF seen\par trigeminal neuralgia improved\par also found to be mildly anemic- he donates blood every 3 months/last was about 4 months ago\par no angina or dyspnea

## 2019-10-29 NOTE — DISCUSSION/SUMMARY
[___ Week(s)] : [unfilled] week(s) [With Me] : with me [FreeTextEntry3] : treadmill stress [FreeTextEntry1] : iron def anemia- may be related to donation. on iron. no more donation for now\par palps- lots of apc'pvc's but no AF seen on holter\par he was switched from amlodapine to cardizem (beta not well tolerated in past)\par echo is unchanged

## 2019-11-11 ENCOUNTER — APPOINTMENT (OUTPATIENT)
Dept: HEART AND VASCULAR | Facility: CLINIC | Age: 79
End: 2019-11-11
Payer: MEDICARE

## 2019-11-11 VITALS
BODY MASS INDEX: 24.77 KG/M2 | SYSTOLIC BLOOD PRESSURE: 118 MMHG | WEIGHT: 173 LBS | HEIGHT: 70 IN | HEART RATE: 54 BPM | TEMPERATURE: 98.3 F | DIASTOLIC BLOOD PRESSURE: 72 MMHG | RESPIRATION RATE: 16 BRPM | OXYGEN SATURATION: 98 %

## 2019-11-11 PROCEDURE — 36415 COLL VENOUS BLD VENIPUNCTURE: CPT

## 2019-11-12 LAB
BASOPHILS # BLD AUTO: 0.04 K/UL
BASOPHILS NFR BLD AUTO: 0.6 %
EOSINOPHIL # BLD AUTO: 0.1 K/UL
EOSINOPHIL NFR BLD AUTO: 1.5 %
FERRITIN SERPL-MCNC: 11 NG/ML
HCT VFR BLD CALC: 39.6 %
HGB BLD-MCNC: 12.1 G/DL
IMM GRANULOCYTES NFR BLD AUTO: 0.3 %
IRON SATN MFR SERPL: 23 %
IRON SERPL-MCNC: 96 UG/DL
LYMPHOCYTES # BLD AUTO: 0.94 K/UL
LYMPHOCYTES NFR BLD AUTO: 14.3 %
MAN DIFF?: NORMAL
MCHC RBC-ENTMCNC: 27.3 PG
MCHC RBC-ENTMCNC: 30.6 GM/DL
MCV RBC AUTO: 89.4 FL
MONOCYTES # BLD AUTO: 0.52 K/UL
MONOCYTES NFR BLD AUTO: 7.9 %
NEUTROPHILS # BLD AUTO: 4.97 K/UL
NEUTROPHILS NFR BLD AUTO: 75.4 %
PLATELET # BLD AUTO: 198 K/UL
RBC # BLD: 4.43 M/UL
RBC # FLD: 17.3 %
TIBC SERPL-MCNC: 422 UG/DL
UIBC SERPL-MCNC: 326 UG/DL
WBC # FLD AUTO: 6.59 K/UL

## 2019-12-09 ENCOUNTER — APPOINTMENT (OUTPATIENT)
Dept: HEART AND VASCULAR | Facility: CLINIC | Age: 79
End: 2019-12-09
Payer: MEDICARE

## 2019-12-09 DIAGNOSIS — I34.0 NONRHEUMATIC MITRAL (VALVE) INSUFFICIENCY: ICD-10-CM

## 2019-12-09 PROCEDURE — 99214 OFFICE O/P EST MOD 30 MIN: CPT | Mod: 25

## 2019-12-09 PROCEDURE — 93015 CV STRESS TEST SUPVJ I&R: CPT

## 2019-12-09 NOTE — HISTORY OF PRESENT ILLNESS
[FreeTextEntry1] : feels well. but not exercising as much\par seen in ER twice in last month w palps and concern he was in AF - No AF seen\par trigeminal neuralgia improved\par also found to be mildly anemic- he donates blood every 3 months/last was about 4 months ago\par no angina or dyspnea

## 2019-12-10 LAB
BASOPHILS # BLD AUTO: 0.03 K/UL
BASOPHILS NFR BLD AUTO: 0.5 %
EOSINOPHIL # BLD AUTO: 0.07 K/UL
EOSINOPHIL NFR BLD AUTO: 1.2 %
FERRITIN SERPL-MCNC: 77 NG/ML
HCT VFR BLD CALC: 42.8 %
HGB BLD-MCNC: 13.4 G/DL
IMM GRANULOCYTES NFR BLD AUTO: 0.3 %
IRON SATN MFR SERPL: 16 %
IRON SERPL-MCNC: 56 UG/DL
LYMPHOCYTES # BLD AUTO: 1.04 K/UL
LYMPHOCYTES NFR BLD AUTO: 17.3 %
MAN DIFF?: NORMAL
MCHC RBC-ENTMCNC: 28 PG
MCHC RBC-ENTMCNC: 31.3 GM/DL
MCV RBC AUTO: 89.4 FL
MONOCYTES # BLD AUTO: 0.34 K/UL
MONOCYTES NFR BLD AUTO: 5.6 %
NEUTROPHILS # BLD AUTO: 4.52 K/UL
NEUTROPHILS NFR BLD AUTO: 75.1 %
PLATELET # BLD AUTO: 196 K/UL
RBC # BLD: 4.79 M/UL
RBC # FLD: 17.3 %
TIBC SERPL-MCNC: 340 UG/DL
UIBC SERPL-MCNC: 284 UG/DL
WBC # FLD AUTO: 6.02 K/UL

## 2019-12-17 ENCOUNTER — RX RENEWAL (OUTPATIENT)
Age: 79
End: 2019-12-17

## 2020-01-15 ENCOUNTER — APPOINTMENT (OUTPATIENT)
Dept: DERMATOLOGY | Facility: CLINIC | Age: 80
End: 2020-01-15
Payer: MEDICARE

## 2020-01-15 VITALS — DIASTOLIC BLOOD PRESSURE: 65 MMHG | SYSTOLIC BLOOD PRESSURE: 129 MMHG

## 2020-01-15 DIAGNOSIS — Z91.89 OTHER SPECIFIED PERSONAL RISK FACTORS, NOT ELSEWHERE CLASSIFIED: ICD-10-CM

## 2020-01-15 DIAGNOSIS — B00.9 HERPESVIRAL INFECTION, UNSPECIFIED: ICD-10-CM

## 2020-01-15 DIAGNOSIS — Z85.828 PERSONAL HISTORY OF OTHER MALIGNANT NEOPLASM OF SKIN: ICD-10-CM

## 2020-01-15 PROCEDURE — 17110 DESTRUCTION B9 LES UP TO 14: CPT | Mod: 59

## 2020-01-15 PROCEDURE — 17000 DESTRUCT PREMALG LESION: CPT | Mod: 59

## 2020-01-15 PROCEDURE — 99203 OFFICE O/P NEW LOW 30 MIN: CPT | Mod: 25

## 2020-01-21 ENCOUNTER — RX RENEWAL (OUTPATIENT)
Age: 80
End: 2020-01-21

## 2020-02-14 ENCOUNTER — RX RENEWAL (OUTPATIENT)
Age: 80
End: 2020-02-14

## 2020-03-03 RX ORDER — DILTIAZEM HYDROCHLORIDE 120 MG/1
120 CAPSULE, EXTENDED RELEASE ORAL DAILY
Qty: 30 | Refills: 3 | Status: DISCONTINUED | COMMUNITY
Start: 2019-10-21 | End: 2020-03-03

## 2020-03-11 ENCOUNTER — RX RENEWAL (OUTPATIENT)
Age: 80
End: 2020-03-11

## 2020-03-12 ENCOUNTER — APPOINTMENT (OUTPATIENT)
Dept: PHYSICAL MEDICINE AND REHAB | Facility: CLINIC | Age: 80
End: 2020-03-12

## 2020-05-21 ENCOUNTER — RX RENEWAL (OUTPATIENT)
Age: 80
End: 2020-05-21

## 2020-07-23 ENCOUNTER — APPOINTMENT (OUTPATIENT)
Dept: HEART AND VASCULAR | Facility: CLINIC | Age: 80
End: 2020-07-23
Payer: MEDICARE

## 2020-07-23 VITALS
RESPIRATION RATE: 16 BRPM | HEART RATE: 62 BPM | HEIGHT: 70 IN | DIASTOLIC BLOOD PRESSURE: 70 MMHG | TEMPERATURE: 98.6 F | OXYGEN SATURATION: 97 % | WEIGHT: 171 LBS | SYSTOLIC BLOOD PRESSURE: 134 MMHG | BODY MASS INDEX: 24.48 KG/M2

## 2020-07-23 PROCEDURE — 93000 ELECTROCARDIOGRAM COMPLETE: CPT

## 2020-07-23 PROCEDURE — 99214 OFFICE O/P EST MOD 30 MIN: CPT

## 2020-07-23 PROCEDURE — 36415 COLL VENOUS BLD VENIPUNCTURE: CPT

## 2020-07-23 RX ORDER — ZOSTER VACCINE RECOMBINANT, ADJUVANTED 50 MCG/0.5
50 KIT INTRAMUSCULAR
Qty: 1 | Refills: 0 | Status: DISCONTINUED | COMMUNITY
Start: 2019-10-22 | End: 2020-07-23

## 2020-07-23 RX ORDER — TAMSULOSIN HYDROCHLORIDE 0.4 MG/1
0.4 CAPSULE ORAL
Qty: 30 | Refills: 5 | Status: DISCONTINUED | COMMUNITY
Start: 2019-04-15 | End: 2020-07-23

## 2020-07-23 RX ORDER — INDAPAMIDE 1.25 MG/1
1.25 TABLET, FILM COATED ORAL DAILY
Qty: 90 | Refills: 1 | Status: DISCONTINUED | COMMUNITY
Start: 2017-05-18 | End: 2020-07-23

## 2020-07-23 RX ORDER — ZOSTER VACCINE RECOMBINANT, ADJUVANTED 50 MCG/0.5
50 KIT INTRAMUSCULAR
Qty: 1 | Refills: 0 | Status: DISCONTINUED | COMMUNITY
Start: 2019-07-19 | End: 2020-07-23

## 2020-07-23 RX ORDER — SILDENAFIL 50 MG/1
50 TABLET ORAL
Qty: 5 | Refills: 5 | Status: DISCONTINUED | COMMUNITY
Start: 2019-04-15 | End: 2020-07-23

## 2020-07-23 NOTE — PHYSICAL EXAM
[Normal Appearance] : normal appearance [Well Groomed] : well groomed [General Appearance - Well Developed] : well developed [General Appearance - In No Acute Distress] : no acute distress [No Deformities] : no deformities [General Appearance - Well Nourished] : well nourished [Normal Conjunctiva] : the conjunctiva exhibited no abnormalities [Eyelids - No Xanthelasma] : the eyelids demonstrated no xanthelasmas [Normal Oral Mucosa] : normal oral mucosa [No Oral Pallor] : no oral pallor [Normal Jugular Venous A Waves Present] : normal jugular venous A waves present [No Oral Cyanosis] : no oral cyanosis [No Jugular Venous Luu A Waves] : no jugular venous luu A waves [Normal Jugular Venous V Waves Present] : normal jugular venous V waves present [Respiration, Rhythm And Depth] : normal respiratory rhythm and effort [Exaggerated Use Of Accessory Muscles For Inspiration] : no accessory muscle use [Auscultation Breath Sounds / Voice Sounds] : lungs were clear to auscultation bilaterally [Heart Rate And Rhythm] : heart rate and rhythm were normal [Heart Sounds] : normal S1 and S2 [Abdomen Soft] : soft [Murmurs] : no murmurs present [Abdomen Tenderness] : non-tender [Abdomen Mass (___ Cm)] : no abdominal mass palpated [Abnormal Walk] : normal gait [Nail Clubbing] : no clubbing of the fingernails [Gait - Sufficient For Exercise Testing] : the gait was sufficient for exercise testing [Petechial Hemorrhages (___cm)] : no petechial hemorrhages [Cyanosis, Localized] : no localized cyanosis [Skin Color & Pigmentation] : normal skin color and pigmentation [] : no rash [Skin Lesions] : no skin lesions [No Venous Stasis] : no venous stasis [Oriented To Time, Place, And Person] : oriented to person, place, and time [No Skin Ulcers] : no skin ulcer [No Xanthoma] : no  xanthoma was observed [Mood] : the mood was normal [No Anxiety] : not feeling anxious [Affect] : the affect was normal [FreeTextEntry1] : 1-2/6 cornell

## 2020-07-23 NOTE — DISCUSSION/SUMMARY
[FreeTextEntry1] : bp 120/70 by me- off indapamide\par return for echo and crtd dopplers and treadmill stress next 3mo

## 2020-07-23 NOTE — HISTORY OF PRESENT ILLNESS
[FreeTextEntry1] : feels well. started power walking every day\par no angina or dyspnea\par BP has been well controlled\par no new comps

## 2020-07-24 LAB
ALBUMIN SERPL ELPH-MCNC: 4.8 G/DL
ALP BLD-CCNC: 68 U/L
ALT SERPL-CCNC: 21 U/L
ANION GAP SERPL CALC-SCNC: 16 MMOL/L
AST SERPL-CCNC: 24 U/L
BASOPHILS # BLD AUTO: 0.05 K/UL
BASOPHILS NFR BLD AUTO: 0.8 %
BILIRUB SERPL-MCNC: 0.6 MG/DL
BUN SERPL-MCNC: 17 MG/DL
CALCIUM SERPL-MCNC: 9.6 MG/DL
CHLORIDE SERPL-SCNC: 106 MMOL/L
CHOLEST SERPL-MCNC: 151 MG/DL
CHOLEST/HDLC SERPL: 3.9 RATIO
CO2 SERPL-SCNC: 21 MMOL/L
CREAT SERPL-MCNC: 0.79 MG/DL
CRP SERPL HS-MCNC: 0.27 MG/L
CRP SERPL-MCNC: <0.1 MG/DL
EOSINOPHIL # BLD AUTO: 0.13 K/UL
EOSINOPHIL NFR BLD AUTO: 2.1 %
ESTIMATED AVERAGE GLUCOSE: 103 MG/DL
FERRITIN SERPL-MCNC: 35 NG/ML
GLUCOSE SERPL-MCNC: 87 MG/DL
HBA1C MFR BLD HPLC: 5.2 %
HCT VFR BLD CALC: 46.8 %
HDLC SERPL-MCNC: 39 MG/DL
HGB BLD-MCNC: 15 G/DL
IMM GRANULOCYTES NFR BLD AUTO: 0.2 %
IRON SATN MFR SERPL: 34 %
IRON SERPL-MCNC: 127 UG/DL
LDLC SERPL CALC-MCNC: 70 MG/DL
LYMPHOCYTES # BLD AUTO: 1.29 K/UL
LYMPHOCYTES NFR BLD AUTO: 21.1 %
MAN DIFF?: NORMAL
MCHC RBC-ENTMCNC: 31.6 PG
MCHC RBC-ENTMCNC: 32.1 GM/DL
MCV RBC AUTO: 98.7 FL
MONOCYTES # BLD AUTO: 0.47 K/UL
MONOCYTES NFR BLD AUTO: 7.7 %
NEUTROPHILS # BLD AUTO: 4.17 K/UL
NEUTROPHILS NFR BLD AUTO: 68.1 %
PLATELET # BLD AUTO: 157 K/UL
POTASSIUM SERPL-SCNC: 4.5 MMOL/L
PROT SERPL-MCNC: 6.5 G/DL
RBC # BLD: 4.74 M/UL
RBC # FLD: 13.4 %
SODIUM SERPL-SCNC: 142 MMOL/L
TIBC SERPL-MCNC: 375 UG/DL
TRIGL SERPL-MCNC: 209 MG/DL
TSH SERPL-ACNC: 2.3 UIU/ML
UIBC SERPL-MCNC: 247 UG/DL
WBC # FLD AUTO: 6.12 K/UL

## 2020-07-27 LAB
SARS-COV-2 IGG SERPL IA-ACNC: 0.12 INDEX
SARS-COV-2 IGG SERPL QL IA: NEGATIVE

## 2020-08-24 ENCOUNTER — RX RENEWAL (OUTPATIENT)
Age: 80
End: 2020-08-24

## 2020-08-31 ENCOUNTER — RX RENEWAL (OUTPATIENT)
Age: 80
End: 2020-08-31

## 2020-09-14 ENCOUNTER — APPOINTMENT (OUTPATIENT)
Dept: PHYSICAL MEDICINE AND REHAB | Facility: CLINIC | Age: 80
End: 2020-09-14
Payer: MEDICARE

## 2020-09-14 ENCOUNTER — RESULT REVIEW (OUTPATIENT)
Age: 80
End: 2020-09-14

## 2020-09-14 ENCOUNTER — OUTPATIENT (OUTPATIENT)
Dept: OUTPATIENT SERVICES | Facility: HOSPITAL | Age: 80
LOS: 1 days | End: 2020-09-14
Payer: COMMERCIAL

## 2020-09-14 DIAGNOSIS — M76.02 GLUTEAL TENDINITIS, LEFT HIP: ICD-10-CM

## 2020-09-14 PROCEDURE — 72170 X-RAY EXAM OF PELVIS: CPT | Mod: 26

## 2020-09-14 PROCEDURE — 72100 X-RAY EXAM L-S SPINE 2/3 VWS: CPT | Mod: 26

## 2020-09-14 PROCEDURE — 99214 OFFICE O/P EST MOD 30 MIN: CPT

## 2020-09-15 ENCOUNTER — RX RENEWAL (OUTPATIENT)
Age: 80
End: 2020-09-15

## 2020-09-15 VITALS — WEIGHT: 171 LBS | HEIGHT: 70 IN | BODY MASS INDEX: 24.48 KG/M2 | RESPIRATION RATE: 16 BRPM

## 2020-09-15 PROBLEM — M76.02 GLUTEAL TENDINITIS, LEFT HIP: Status: ACTIVE | Noted: 2020-09-15

## 2020-09-17 ENCOUNTER — APPOINTMENT (OUTPATIENT)
Dept: NEUROLOGY | Facility: CLINIC | Age: 80
End: 2020-09-17
Payer: MEDICARE

## 2020-09-17 VITALS
HEART RATE: 68 BPM | WEIGHT: 172 LBS | OXYGEN SATURATION: 98 % | DIASTOLIC BLOOD PRESSURE: 74 MMHG | BODY MASS INDEX: 24.62 KG/M2 | TEMPERATURE: 98.6 F | HEIGHT: 70 IN | SYSTOLIC BLOOD PRESSURE: 132 MMHG

## 2020-09-17 DIAGNOSIS — G47.00 INSOMNIA, UNSPECIFIED: ICD-10-CM

## 2020-09-17 DIAGNOSIS — M25.519 PAIN IN UNSPECIFIED SHOULDER: ICD-10-CM

## 2020-09-17 PROCEDURE — 99214 OFFICE O/P EST MOD 30 MIN: CPT

## 2020-09-23 NOTE — ED ADULT NURSE NOTE - PAIN: PRESENCE, MLM
CC:  Megan L Friedel       is here today for GYN exam;IUD removal.    Medications: medications verified and updated  Refills ordered today?  NO  Tobacco history: verified  Last pap: 2018 Neg/Neg   denies pain/discomfort

## 2020-10-13 DIAGNOSIS — Z86.79 PERSONAL HISTORY OF OTHER DISEASES OF THE CIRCULATORY SYSTEM: ICD-10-CM

## 2020-10-13 DIAGNOSIS — Z82.49 FAMILY HISTORY OF ISCHEMIC HEART DISEASE AND OTHER DISEASES OF THE CIRCULATORY SYSTEM: ICD-10-CM

## 2020-10-13 DIAGNOSIS — R20.2 PARESTHESIA OF SKIN: ICD-10-CM

## 2020-10-13 DIAGNOSIS — Z86.69 PERSONAL HISTORY OF OTHER DISEASES OF THE NERVOUS SYSTEM AND SENSE ORGANS: ICD-10-CM

## 2020-10-14 ENCOUNTER — APPOINTMENT (OUTPATIENT)
Dept: UROLOGY | Facility: CLINIC | Age: 80
End: 2020-10-14
Payer: MEDICARE

## 2020-10-14 DIAGNOSIS — Z82.3 FAMILY HISTORY OF STROKE: ICD-10-CM

## 2020-10-14 DIAGNOSIS — N43.3 HYDROCELE, UNSPECIFIED: ICD-10-CM

## 2020-10-14 DIAGNOSIS — R20.2 PARESTHESIA OF SKIN: ICD-10-CM

## 2020-10-14 PROCEDURE — 76857 US EXAM PELVIC LIMITED: CPT

## 2020-10-14 PROCEDURE — 99215 OFFICE O/P EST HI 40 MIN: CPT | Mod: 25

## 2020-10-14 RX ORDER — SILDENAFIL 100 MG/1
100 TABLET, FILM COATED ORAL
Qty: 5 | Refills: 5 | Status: DISCONTINUED | COMMUNITY
Start: 2019-04-15 | End: 2020-10-14

## 2020-10-14 NOTE — PHYSICAL EXAM
[General Appearance - Well Developed] : well developed [General Appearance - Well Nourished] : well nourished [Normal Appearance] : normal appearance [Well Groomed] : well groomed [General Appearance - In No Acute Distress] : no acute distress [Abdomen Soft] : soft [Abdomen Tenderness] : non-tender [Costovertebral Angle Tenderness] : no ~M costovertebral angle tenderness [Urethral Meatus] : meatus normal [Urinary Bladder Findings] : the bladder was normal on palpation [Scrotum] : the scrotum was normal [Testes Mass (___cm)] : there were no testicular masses [No Prostate Nodules] : no prostate nodules [Edema] : no peripheral edema [] : no respiratory distress [Respiration, Rhythm And Depth] : normal respiratory rhythm and effort [Exaggerated Use Of Accessory Muscles For Inspiration] : no accessory muscle use [Oriented To Time, Place, And Person] : oriented to person, place, and time [Affect] : the affect was normal [Mood] : the mood was normal [Not Anxious] : not anxious [Normal Station and Gait] : the gait and station were normal for the patient's age [No Focal Deficits] : no focal deficits [No Palpable Adenopathy] : no palpable adenopathy [Penis Abnormality] : normal circumcised penis [Testes Tenderness] : no tenderness of the testes [Prostate Tenderness] : the prostate was not tender [Skin Color & Pigmentation] : normal skin color and pigmentation [Abdomen Mass (___ Cm)] : no abdominal mass palpated [Abdomen Hernia] : no hernia was discovered [FreeTextEntry1] : Bilateral hydroceles.  1 cm right epididymal cyst.

## 2020-10-14 NOTE — REVIEW OF SYSTEMS
[Eyesight Problems] : eyesight problems [Palpitations] : palpitations [see HPI] : see HPI [Urine retention] : urine retention [Wake up at night to urinate  How many times?  ___] : wakes up to urinate [unfilled] times during the night [Slow urine stream] : slow urine stream [Joint Pain] : joint pain [Itching] : itching [Negative] : Heme/Lymph [FreeTextEntry3] : sinus problems [FreeTextEntry2] : , HTN

## 2020-10-14 NOTE — LETTER BODY
[Dear  ___] : Dear  [unfilled], [Please see my note below.] : Please see my note below. [Consult Closing:] : Thank you very much for allowing me to participate in the care of this patient.  If you have any questions, please do not hesitate to contact me. [Sincerely,] : Sincerely, [DrIzaiah  ___] : Dr. CASTANEDA [FreeTextEntry3] : Blanco Griffin MD, FACS

## 2020-10-14 NOTE — ADDENDUM
[FreeTextEntry1] : A portion of this note was written by [Yfn Limon] on 10/13/2020 acting as a scribe for Dr. Griffin.\par \par I have personally reviewed the chart and agree that the record accurately reflects my personal performance of the history, physical exam, assessment and plan.

## 2020-10-14 NOTE — REVIEW OF SYSTEMS
[Eyesight Problems] : eyesight problems [Palpitations] : palpitations [see HPI] : see HPI [Urine retention] : urine retention [Wake up at night to urinate  How many times?  ___] : wakes up to urinate [unfilled] times during the night [Slow urine stream] : slow urine stream [Joint Pain] : joint pain [Itching] : itching [Negative] : Heme/Lymph [FreeTextEntry3] : sinus problems [FreeTextEntry2] : , frequent urination

## 2020-10-14 NOTE — HISTORY OF PRESENT ILLNESS
[FreeTextEntry1] : Mr. KENAN PIRES comes in today for his urologic follow-up.  He presents with moderate to significant chronic lower urinary tract symptoms (obstructive and irritative) with nocturia x2. He is continuing on tamsulosin 0.4mg and Cialis 5mg daily. (In the past he experienced dizziness with alfuzosin--but not tamsulosin).  He had a Greenlight laser prostatectomy in 2001 with Dr. Skinner.\par IPSS: 27/35\par Sono: 61cc PVR; 30cc prostate  \par (Feb 2019 PVR: 204cc)\par \par Mr. Pires reports some degree of erectile dysfunction and does not with to pursue treatment specifically for this as he does not have a partner.  He feels that the Cialis has allowed him to experience morning erections. He is experiencing difficulty ejaculating, which is age related and exacerbated by the alpha blocker. \par \par PSA: 5/19/17--1.45; 11/5/13--1.0

## 2020-10-14 NOTE — ASSESSMENT
[FreeTextEntry1] : I discussed the findings and options with Mr. KENAN PIRES in detail.\par I also outlined the various prostatic reductive surgical procedures with Mr. KENAN PIRES  (including the gold standard TURP, laser prostatectomy [HoLEP, Greenlight], urethral lift [Uro-Lift], water vapor thermal therapy [Rezum], aquablation [AquaBeam Robotic System], prostatic artery embolization), as well as the risks and benefits of each.\par \par For now Mr. Pires will continue with the tamsulosin 0.4mg and tadalafil 5mg daily doses.\par \par I look forward to seeing him in 1 year (bladder sonogram).  We are no longer monitoring PSAs.\par \par

## 2020-11-02 ENCOUNTER — APPOINTMENT (OUTPATIENT)
Dept: HEART AND VASCULAR | Facility: CLINIC | Age: 80
End: 2020-11-02
Payer: MEDICARE

## 2020-11-02 VITALS
TEMPERATURE: 98 F | DIASTOLIC BLOOD PRESSURE: 60 MMHG | HEIGHT: 70 IN | RESPIRATION RATE: 16 BRPM | HEART RATE: 61 BPM | WEIGHT: 172 LBS | OXYGEN SATURATION: 96 % | BODY MASS INDEX: 24.62 KG/M2 | SYSTOLIC BLOOD PRESSURE: 110 MMHG

## 2020-11-02 PROCEDURE — 93015 CV STRESS TEST SUPVJ I&R: CPT

## 2020-11-02 PROCEDURE — 99214 OFFICE O/P EST MOD 30 MIN: CPT | Mod: 25

## 2020-11-02 PROCEDURE — 99072 ADDL SUPL MATRL&STAF TM PHE: CPT

## 2020-11-02 PROCEDURE — 93880 EXTRACRANIAL BILAT STUDY: CPT

## 2020-11-02 PROCEDURE — 93306 TTE W/DOPPLER COMPLETE: CPT

## 2020-11-02 NOTE — HISTORY OF PRESENT ILLNESS
[FreeTextEntry1] : for cardiac testing today\par feels well. started power walking every day\par no angina or dyspnea\par BP has been well controlled\par no new comps

## 2020-11-02 NOTE — DISCUSSION/SUMMARY
[FreeTextEntry1] : aorta stable, bp well controlled\par pneumo 23 by md\par stress test neg\par crtds unchanged\par f/u 6 mo

## 2020-11-24 ENCOUNTER — RX RENEWAL (OUTPATIENT)
Age: 80
End: 2020-11-24

## 2021-01-07 ENCOUNTER — APPOINTMENT (OUTPATIENT)
Dept: NEUROLOGY | Facility: CLINIC | Age: 81
End: 2021-01-07

## 2021-01-11 ENCOUNTER — RX RENEWAL (OUTPATIENT)
Age: 81
End: 2021-01-11

## 2021-02-19 ENCOUNTER — RX RENEWAL (OUTPATIENT)
Age: 81
End: 2021-02-19

## 2021-02-26 ENCOUNTER — RX RENEWAL (OUTPATIENT)
Age: 81
End: 2021-02-26

## 2021-04-09 ENCOUNTER — APPOINTMENT (OUTPATIENT)
Dept: ORTHOPEDIC SURGERY | Facility: CLINIC | Age: 81
End: 2021-04-09
Payer: COMMERCIAL

## 2021-04-09 DIAGNOSIS — M19.012 PRIMARY OSTEOARTHRITIS, LEFT SHOULDER: ICD-10-CM

## 2021-04-09 PROCEDURE — 99072 ADDL SUPL MATRL&STAF TM PHE: CPT

## 2021-04-09 PROCEDURE — 99204 OFFICE O/P NEW MOD 45 MIN: CPT

## 2021-05-21 ENCOUNTER — RX RENEWAL (OUTPATIENT)
Age: 81
End: 2021-05-21

## 2021-06-21 ENCOUNTER — APPOINTMENT (OUTPATIENT)
Dept: ORTHOPEDIC SURGERY | Facility: CLINIC | Age: 81
End: 2021-06-21
Payer: MEDICARE

## 2021-06-21 VITALS
DIASTOLIC BLOOD PRESSURE: 74 MMHG | BODY MASS INDEX: 24.62 KG/M2 | WEIGHT: 172 LBS | SYSTOLIC BLOOD PRESSURE: 126 MMHG | HEART RATE: 69 BPM | HEIGHT: 70 IN

## 2021-06-21 PROCEDURE — 20605 DRAIN/INJ JOINT/BURSA W/O US: CPT | Mod: F5

## 2021-06-21 PROCEDURE — 20600 DRAIN/INJ JOINT/BURSA W/O US: CPT | Mod: F5

## 2021-06-21 PROCEDURE — 99215 OFFICE O/P EST HI 40 MIN: CPT | Mod: 25

## 2021-06-21 PROCEDURE — 99072 ADDL SUPL MATRL&STAF TM PHE: CPT

## 2021-06-21 PROCEDURE — 73130 X-RAY EXAM OF HAND: CPT | Mod: RT

## 2021-06-21 PROCEDURE — 20550 NJX 1 TENDON SHEATH/LIGAMENT: CPT | Mod: F5,59

## 2021-08-10 DIAGNOSIS — Z00.00 ENCOUNTER FOR GENERAL ADULT MEDICAL EXAMINATION W/OUT ABNORMAL FINDINGS: ICD-10-CM

## 2021-08-24 ENCOUNTER — RX RENEWAL (OUTPATIENT)
Age: 81
End: 2021-08-24

## 2021-09-10 ENCOUNTER — APPOINTMENT (OUTPATIENT)
Dept: ORTHOPEDIC SURGERY | Facility: CLINIC | Age: 81
End: 2021-09-10
Payer: MEDICARE

## 2021-09-10 VITALS
HEIGHT: 70 IN | SYSTOLIC BLOOD PRESSURE: 137 MMHG | OXYGEN SATURATION: 96 % | BODY MASS INDEX: 24.62 KG/M2 | WEIGHT: 172 LBS | DIASTOLIC BLOOD PRESSURE: 69 MMHG | HEART RATE: 52 BPM

## 2021-09-10 DIAGNOSIS — M65.311 TRIGGER THUMB, RIGHT THUMB: ICD-10-CM

## 2021-09-10 PROCEDURE — 20550 NJX 1 TENDON SHEATH/LIGAMENT: CPT | Mod: F5

## 2021-09-10 PROCEDURE — 99214 OFFICE O/P EST MOD 30 MIN: CPT | Mod: 25

## 2021-09-24 ENCOUNTER — APPOINTMENT (OUTPATIENT)
Dept: DERMATOLOGY | Facility: CLINIC | Age: 81
End: 2021-09-24
Payer: MEDICARE

## 2021-09-24 PROCEDURE — 17000 DESTRUCT PREMALG LESION: CPT

## 2021-09-24 PROCEDURE — 99214 OFFICE O/P EST MOD 30 MIN: CPT | Mod: 25

## 2021-09-24 NOTE — ASSESSMENT
[FreeTextEntry1] : acne, new problem, chronic x yrs\par rx clinda solution prn\par \par AK \par cryo today x 1 lesion\par LN2x2\par \par pruritus - likely related to dry skin\par rec CeraVe Itch Relief

## 2021-09-24 NOTE — HISTORY OF PRESENT ILLNESS
[FreeTextEntry1] : skin check [de-identified] : skin check\par also gets acne from time to time uses clinda\par itchy on back, legs, arms sometimes\par doesn't moisturize

## 2021-10-23 ENCOUNTER — EMERGENCY (EMERGENCY)
Facility: HOSPITAL | Age: 81
LOS: 1 days | Discharge: HOME | End: 2021-10-23
Attending: EMERGENCY MEDICINE | Admitting: EMERGENCY MEDICINE
Payer: MEDICARE

## 2021-10-23 VITALS
TEMPERATURE: 98 F | SYSTOLIC BLOOD PRESSURE: 119 MMHG | DIASTOLIC BLOOD PRESSURE: 59 MMHG | OXYGEN SATURATION: 100 % | RESPIRATION RATE: 19 BRPM | HEART RATE: 58 BPM

## 2021-10-23 VITALS
TEMPERATURE: 98 F | DIASTOLIC BLOOD PRESSURE: 60 MMHG | OXYGEN SATURATION: 97 % | SYSTOLIC BLOOD PRESSURE: 115 MMHG | HEART RATE: 63 BPM | RESPIRATION RATE: 16 BRPM | HEIGHT: 70 IN

## 2021-10-23 LAB
ALBUMIN SERPL ELPH-MCNC: 4.2 G/DL — SIGNIFICANT CHANGE UP (ref 3.3–5)
ALP SERPL-CCNC: 62 U/L — SIGNIFICANT CHANGE UP (ref 40–120)
ALT FLD-CCNC: 17 U/L — SIGNIFICANT CHANGE UP (ref 4–41)
ANION GAP SERPL CALC-SCNC: 13 MMOL/L — SIGNIFICANT CHANGE UP (ref 7–14)
AST SERPL-CCNC: 23 U/L — SIGNIFICANT CHANGE UP (ref 4–40)
BASOPHILS # BLD AUTO: 0.03 K/UL — SIGNIFICANT CHANGE UP (ref 0–0.2)
BASOPHILS NFR BLD AUTO: 0.5 % — SIGNIFICANT CHANGE UP (ref 0–2)
BILIRUB SERPL-MCNC: 0.4 MG/DL — SIGNIFICANT CHANGE UP (ref 0.2–1.2)
BUN SERPL-MCNC: 17 MG/DL — SIGNIFICANT CHANGE UP (ref 7–23)
CALCIUM SERPL-MCNC: 8.8 MG/DL — SIGNIFICANT CHANGE UP (ref 8.4–10.5)
CHLORIDE SERPL-SCNC: 102 MMOL/L — SIGNIFICANT CHANGE UP (ref 98–107)
CO2 SERPL-SCNC: 20 MMOL/L — LOW (ref 22–31)
CREAT SERPL-MCNC: 0.7 MG/DL — SIGNIFICANT CHANGE UP (ref 0.5–1.3)
EOSINOPHIL # BLD AUTO: 0.09 K/UL — SIGNIFICANT CHANGE UP (ref 0–0.5)
EOSINOPHIL NFR BLD AUTO: 1.5 % — SIGNIFICANT CHANGE UP (ref 0–6)
GLUCOSE SERPL-MCNC: 112 MG/DL — HIGH (ref 70–99)
HCT VFR BLD CALC: 41 % — SIGNIFICANT CHANGE UP (ref 39–50)
HGB BLD-MCNC: 13.9 G/DL — SIGNIFICANT CHANGE UP (ref 13–17)
IANC: 4.71 K/UL — SIGNIFICANT CHANGE UP (ref 1.5–8.5)
IMM GRANULOCYTES NFR BLD AUTO: 0.3 % — SIGNIFICANT CHANGE UP (ref 0–1.5)
LYMPHOCYTES # BLD AUTO: 0.72 K/UL — LOW (ref 1–3.3)
LYMPHOCYTES # BLD AUTO: 12 % — LOW (ref 13–44)
MAGNESIUM SERPL-MCNC: 2.1 MG/DL — SIGNIFICANT CHANGE UP (ref 1.6–2.6)
MCHC RBC-ENTMCNC: 31.7 PG — SIGNIFICANT CHANGE UP (ref 27–34)
MCHC RBC-ENTMCNC: 33.9 GM/DL — SIGNIFICANT CHANGE UP (ref 32–36)
MCV RBC AUTO: 93.4 FL — SIGNIFICANT CHANGE UP (ref 80–100)
MONOCYTES # BLD AUTO: 0.44 K/UL — SIGNIFICANT CHANGE UP (ref 0–0.9)
MONOCYTES NFR BLD AUTO: 7.3 % — SIGNIFICANT CHANGE UP (ref 2–14)
NEUTROPHILS # BLD AUTO: 4.71 K/UL — SIGNIFICANT CHANGE UP (ref 1.8–7.4)
NEUTROPHILS NFR BLD AUTO: 78.4 % — HIGH (ref 43–77)
NRBC # BLD: 0 /100 WBCS — SIGNIFICANT CHANGE UP
NRBC # FLD: 0 K/UL — SIGNIFICANT CHANGE UP
PHOSPHATE SERPL-MCNC: 2.7 MG/DL — SIGNIFICANT CHANGE UP (ref 2.5–4.5)
PLATELET # BLD AUTO: 154 K/UL — SIGNIFICANT CHANGE UP (ref 150–400)
POTASSIUM SERPL-MCNC: 4.3 MMOL/L — SIGNIFICANT CHANGE UP (ref 3.5–5.3)
POTASSIUM SERPL-SCNC: 4.3 MMOL/L — SIGNIFICANT CHANGE UP (ref 3.5–5.3)
PROT SERPL-MCNC: 6.4 G/DL — SIGNIFICANT CHANGE UP (ref 6–8.3)
RBC # BLD: 4.39 M/UL — SIGNIFICANT CHANGE UP (ref 4.2–5.8)
RBC # FLD: 13.3 % — SIGNIFICANT CHANGE UP (ref 10.3–14.5)
SODIUM SERPL-SCNC: 135 MMOL/L — SIGNIFICANT CHANGE UP (ref 135–145)
TROPONIN T, HIGH SENSITIVITY RESULT: 16 NG/L — SIGNIFICANT CHANGE UP
TROPONIN T, HIGH SENSITIVITY RESULT: 16 NG/L — SIGNIFICANT CHANGE UP
TSH SERPL-MCNC: 2.14 UIU/ML — SIGNIFICANT CHANGE UP (ref 0.27–4.2)
WBC # BLD: 6.01 K/UL — SIGNIFICANT CHANGE UP (ref 3.8–10.5)
WBC # FLD AUTO: 6.01 K/UL — SIGNIFICANT CHANGE UP (ref 3.8–10.5)

## 2021-10-23 PROCEDURE — 71045 X-RAY EXAM CHEST 1 VIEW: CPT | Mod: 26

## 2021-10-23 PROCEDURE — 99285 EMERGENCY DEPT VISIT HI MDM: CPT | Mod: 25

## 2021-10-23 PROCEDURE — 93010 ELECTROCARDIOGRAM REPORT: CPT

## 2021-10-23 NOTE — ED ADULT NURSE NOTE - OBJECTIVE STATEMENT
Received pt in room 11. pt A&OX3, ambulatory. pt with hx of HTN, mitral valve regurgitation, descending aortic aneurysm. Pt c/o intermittent palpitations x 1week worsening over the last few days. pt reports no other complaints, no pain, sob, cough, fever/ chills, headache, dizziness, n/v. respirations are equal and nonlabored, no respiratory distress noted sating 100% on room air. NSR on monitor. 20 gauge iv placed in the right ac, labs sent. pt stable, safety maintained, awaiting further plan will reassess and monitor

## 2021-10-23 NOTE — ED PROVIDER NOTE - NS ED ROS FT
Constitutional: (-) fever (-) vomiting  Eyes/ENT: (-) vision changes, (-) hearing changes  Cardiovascular: (-) chest pain, (-) wheezing, +palpitations  Respiratory: (-) cough, (-) shortness of breath  Gastrointestinal: (-) vomiting, (-) diarrhea, (-) abdominal pain  : (-) dysuria   Musculoskeletal: (-) back pain  Integumentary: (-) rash, (-) edema  Neurological: (-)loc  Allergic/Immunologic: (-) pruritus  Endocrine: No history of thyroid disease

## 2021-10-23 NOTE — ED PROVIDER NOTE - NSFOLLOWUPINSTRUCTIONS_ED_ALL_ED_FT
You came to the ER with palpitations and we evaluated you with an EKG and bloodwork and did not identify an exact cause. At this time you are stable for discharge. Follow up with your cardiologist and make an appointment next week. Your results are attached bring them with you to your appointment.     Return to the ER for worsening symptoms, chest pain, new back pain, passing out, fevers, difficulty breathing or for any concern you would like evaluated.

## 2021-10-23 NOTE — ED PROVIDER NOTE - NSICDXPASTMEDICALHX_GEN_ALL_CORE_FT
PAST MEDICAL HISTORY:  Descending aortic aneurysm     Hypertension     Mitral valve regurgitation

## 2021-10-23 NOTE — ED PROVIDER NOTE - PATIENT PORTAL LINK FT
You can access the FollowMyHealth Patient Portal offered by [facility] by registering at the following website: [email or web address]. By joining [employer]’s FollowMyHealth portal, you will also be able to view your health information using other applications (apps) compatible with our system.

## 2021-10-23 NOTE — ED PROVIDER NOTE - PHYSICAL EXAMINATION
Vitals: I have reviewed the patients vital signs  General: Well dressed, well appearing, no acute distress  HEENT: Atraumatic, normocephalic, airway patent  Eyes: EOMI, tracking appropriately  Neck: no tracheal deviation, no JVD  Chest/Lungs: no trauma, symmetric chest rise, speaking in complete sentences, no WOB  Heart: skin and extremities well perfused, irregular rhythm, rate wnl. 2+ pulses x4, no peripheral edema   Neuro: A+Ox3, ambulating without difficulty, CN grossly intact  Abd: soft, nontender non distended no peritoneal signs no palpable masses or pulsatility appreciated.   MSK: strength at baseline in all extremities, no muscle wasting or atrophy  Skin: no cyanosis, no jaundice, no new emergent lesions

## 2021-10-23 NOTE — ED PROVIDER NOTE - CLINICAL SUMMARY MEDICAL DECISION MAKING FREE TEXT BOX
82 y/o M hx htn, hld, known aortic aneurism here with palpitations since tuesday, has had similar in past, began after colonoscopy, has cards f/u with Dr Cartwright. In past was found to have PACs, feels similar but now lasts longer. No cp, sob, syncope, abd pain, fevers. Exam no abd tenderness, irregular rhythm without ischemic changes, well appearing, 2+ pulses. Will check electrolytes, troponin, xr, no indication for CT imaging as anyeurism is stable, pt without pain or syncope. If no emergent findings on labs will dc.

## 2021-10-23 NOTE — ED PROVIDER NOTE - OBJECTIVE STATEMENT
80 y/o M hx of HTN, HLD, known aortic aneurism, here with palpitations. Had colonoscopy on tuesday, states after anesthesia began feeling intermittent palpitations similar to previous. was in [facility] ED twice for same in EMR. Has cards f/u with Dr Cartwright but came to ED today bc was concerned about the duration of these palpiations being much longer than usual. denies cp, sob, loc, n/v/d, syncope, abd pain. tolerating PO. not on beta blocker. 82 y/o M hx of HTN, HLD, known aortic aneurism, here with palpitations. Had colonoscopy on tuesday, states after anesthesia began feeling intermittent palpitations similar to previous. was in [facility] ED twice for same in EMR. Has cards f/u with Dr Cartwright but came to ED today bc was concerned about the duration of these palpiations being much longer than usual. denies cp, sob, loc, n/v/d, syncope, abd pain. tolerating PO. not on beta blocker.    Attendinyo male presents with palpitations intermittently since having colonoscopy about 5 days ago.  no lightheadedness.  no shortness of breath. no chest pain.

## 2021-10-23 NOTE — ED PROVIDER NOTE - PROGRESS NOTE DETAILS
Andres: no events on tele monitor. trop x2 with no increase. Has good cards f/u outpatient. Results explained, printed and given to patient, patient given discharge instructions and information for follow up and return precautions.

## 2021-10-31 ENCOUNTER — EMERGENCY (EMERGENCY)
Facility: HOSPITAL | Age: 81
LOS: 1 days | Discharge: HOME | End: 2021-10-31
Attending: STUDENT IN AN ORGANIZED HEALTH CARE EDUCATION/TRAINING PROGRAM | Admitting: STUDENT IN AN ORGANIZED HEALTH CARE EDUCATION/TRAINING PROGRAM
Payer: MEDICARE

## 2021-10-31 VITALS
SYSTOLIC BLOOD PRESSURE: 119 MMHG | OXYGEN SATURATION: 98 % | DIASTOLIC BLOOD PRESSURE: 67 MMHG | RESPIRATION RATE: 16 BRPM | HEART RATE: 60 BPM

## 2021-10-31 VITALS
RESPIRATION RATE: 18 BRPM | HEART RATE: 79 BPM | HEIGHT: 70 IN | TEMPERATURE: 99 F | SYSTOLIC BLOOD PRESSURE: 131 MMHG | OXYGEN SATURATION: 100 % | DIASTOLIC BLOOD PRESSURE: 73 MMHG

## 2021-10-31 PROCEDURE — 72125 CT NECK SPINE W/O DYE: CPT | Mod: 26,MA

## 2021-10-31 PROCEDURE — 70486 CT MAXILLOFACIAL W/O DYE: CPT | Mod: 26,MA

## 2021-10-31 PROCEDURE — 70450 CT HEAD/BRAIN W/O DYE: CPT | Mod: 26,MA

## 2021-10-31 PROCEDURE — 12013 RPR F/E/E/N/L/M 2.6-5.0 CM: CPT

## 2021-10-31 PROCEDURE — 99285 EMERGENCY DEPT VISIT HI MDM: CPT | Mod: 25

## 2021-10-31 RX ORDER — TETANUS TOXOID, REDUCED DIPHTHERIA TOXOID AND ACELLULAR PERTUSSIS VACCINE, ADSORBED 5; 2.5; 8; 8; 2.5 [IU]/.5ML; [IU]/.5ML; UG/.5ML; UG/.5ML; UG/.5ML
0.5 SUSPENSION INTRAMUSCULAR ONCE
Refills: 0 | Status: COMPLETED | OUTPATIENT
Start: 2021-10-31 | End: 2021-10-31

## 2021-10-31 RX ADMIN — TETANUS TOXOID, REDUCED DIPHTHERIA TOXOID AND ACELLULAR PERTUSSIS VACCINE, ADSORBED 0.5 MILLILITER(S): 5; 2.5; 8; 8; 2.5 SUSPENSION INTRAMUSCULAR at 17:29

## 2021-10-31 NOTE — ED ADULT TRIAGE NOTE - CHIEF COMPLAINT QUOTE
Pt s/p trip and fall off bus this afternoon. Pt noted to have laceration above left eye. Wound dressed in triage, bleeding controlled. Pt denies AC usage. PMH HTN, palpitations

## 2021-10-31 NOTE — ED PROVIDER NOTE - PATIENT PORTAL LINK FT
You can access the FollowMyHealth Patient Portal offered by [facility] by registering at the following website: [email or web address]. By joining [employer]’s FollowMyHealth portal, you will also be able to view your health information using other applications (apps) compatible with our system.

## 2021-10-31 NOTE — ED PROVIDER NOTE - NS ED ROS FT
CONST: no fevers, no chills  EYES: no pain, no vision changes  ENT: no sore throat, no ear pain, no change in hearing  CV: no chest pain, no leg swelling  RESP: no shortness of breath, no cough  ABD: no abdominal pain, no nausea, no vomiting, no diarrhea  : no dysuria, no flank pain, no hematuria  MSK: no back pain, no extremity pain  NEURO: no headache or additional neurologic complaints  HEME: no easy bleeding  SKIN: open laceration on left forehead s/p fall

## 2021-10-31 NOTE — ED PROVIDER NOTE - CLINICAL SUMMARY MEDICAL DECISION MAKING FREE TEXT BOX
82 yo male patient PMH SVT, HTN, BPH, hyperlipidemia who visits ED s/p trip and fall. Patient denies syncope/LOC. Patient evaluated and found with no neuro deficits. Open laceration seen on left forehead. Will suture and evaluate with imaging to rule out intracranial hemorrhage.

## 2021-10-31 NOTE — ED PROVIDER NOTE - ATTENDING CONTRIBUTION TO CARE
81M w h/o HTN, SVT, cardiac monitor patch in place, presents s/p trip and fell today. States he missed a step getting out of a bus. States he fell and hit his head. Denies syncope, loc, palpitations, chest pain, numbness, weakness, n/v, sob, or other symptoms     Except as documented in the HPI,  all other systems are negative    CONSTITUTIONAL: NAD, awake, alert  HEAD: Normocephalic; atraumatic  EYES: EOMI, no nystagmus, PERRL  ENMT: External appears normal, MMM  NECK: no spinal tenderness, FROM  CARD: Normal Sl, S2; no audible murmurs,rubs  RESP: normal wob, lungs ctab  ABD: soft, non-distended; non-tender  MSK: no spinal tenderness, no edema, no bony tenderness  SKIN: 1.5 cm vertical lac above left brow,   NEURO: aaox3, moving all extremities spontaneously, 5/5 strength throughout, sensation grossly intact, no drift, no droop     81M w h/o HTN, SVT, cardiac monitor patch in place, presents s/p trip and fell today, no syncope or cardiac symptoms, neuro intact, well appearing, states he remembers entire event, ct r/o bleed, lac repair, signed out pending ct reads

## 2021-10-31 NOTE — ED PROVIDER NOTE - PHYSICAL EXAMINATION
GENERAL: Awake, alert, NAD  HEENT: NC/AT, moist mucous membranes, PERRL, EOMI  LUNGS: CTAB, no wheezes or crackles   CARDIAC: RRR, no m/r/g  ABDOMEN: Soft, normal BS, non tender, non distended, no rebound, no guarding  BACK: No midline spinal tenderness, no CVA tenderness  EXT: No edema, no calf tenderness, 2+ DP pulses bilaterally, no deformities.  NEURO: A&Ox3. Moving all extremities. CNs intact  SKIN: Warm and dry. No rash.  PSYCH: Normal affect.

## 2021-10-31 NOTE — ED PROVIDER NOTE - OBJECTIVE STATEMENT
82 yo male pt pmh HTN, SVT, hyperlipidemia, BPH, mitral valve regurg, and ascending aortic aneurysm; who presents s/p trip and fall today. Patient denies syncope, LOC, palpitations, cp, SOB, HA, n/v, neurologic deficits. Patient refers trauma to left forehead with open wound. Wound with no active bleeding.

## 2021-10-31 NOTE — ED PROVIDER NOTE - NSFOLLOWUPINSTRUCTIONS_ED_ALL_ED_FT
You came in after fall. You were evaluated and found with a laceration that was sutured today. Imaging was done to rule out intracranial bleeding.     1. Please follow up with your primary care physician for further management.   2. May visit on friday for suture removal. Would advise to come in early in the morning.   3. Please return to the ED if you begin to experience symptoms such as nausea, emesis, loss of consciousness, etc.

## 2021-11-05 ENCOUNTER — EMERGENCY (EMERGENCY)
Facility: HOSPITAL | Age: 81
LOS: 1 days | Discharge: HOME | End: 2021-11-05
Attending: STUDENT IN AN ORGANIZED HEALTH CARE EDUCATION/TRAINING PROGRAM | Admitting: STUDENT IN AN ORGANIZED HEALTH CARE EDUCATION/TRAINING PROGRAM
Payer: MEDICARE

## 2021-11-05 VITALS
DIASTOLIC BLOOD PRESSURE: 74 MMHG | SYSTOLIC BLOOD PRESSURE: 121 MMHG | HEART RATE: 50 BPM | TEMPERATURE: 98 F | HEIGHT: 70 IN | RESPIRATION RATE: 17 BRPM | OXYGEN SATURATION: 100 %

## 2021-11-05 PROCEDURE — L9995: CPT

## 2021-11-05 NOTE — ED PROVIDER NOTE - NSFOLLOWUPINSTRUCTIONS_ED_ALL_ED_FT
Thank you for visiting our Emergency Department, it has been a pleasure taking part in your healthcare.    Your discharge diagnosis is: suture removal  Please take all discharge medications as indicated below:  Please continue all medications as rx'd by your PMD.  Please follow up with your PMD within x48 hours.  A copy of resulted labs, imaging, and findings have been provided to you.   You have had a detailed discussion with your provider regarding your diagnosis, care management and discharge planning including, but not limited to: return precautions, follow up visits with existing or new providers, new prescriptions and/or medication changes, wound and/or splint/cast care or other care   aspects specific to your diagnosis and treatment. You have been given the opportunity to have your questions answered. At this time you have been deemed stable and fit for discharge.  Return precautions to the Emergency Department include but are not limited to: unrelenting nausea, vomiting, fever, chills, chest pain, shortness of breath, dizziness, chest or abdominal pain, worsening back pain, syncope, blood in urine or stool, headache that doesn't resolve, numbness or tingling, loss of sensation, loss of motor function, or any other concerning symptoms.

## 2021-11-05 NOTE — ED PROVIDER NOTE - CLINICAL SUMMARY MEDICAL DECISION MAKING FREE TEXT BOX
Zhanna LY: 81M s/p recent lac repair to forehead, here for suture removal reports wound healing well without issue. exam vss non toxic well appearing vitals as above will remove sutures educate on wound care stable for dc. pmd fu.

## 2021-11-05 NOTE — ED PROVIDER NOTE - PATIENT PORTAL LINK FT
You can access the FollowMyHealth Patient Portal offered by Health system by registering at the following website: [email or web address]. By joining [employer]’s FollowMyHealth portal, you will also be able to view your health information using other applications (apps) compatible with our system.

## 2021-11-05 NOTE — ED ADULT NURSE NOTE - OBJECTIVE STATEMENT
Pt requiring suture to upper left eyelid to be removed. area clean, no discharge notef. md at bedside

## 2021-11-05 NOTE — ED PROVIDER NOTE - PHYSICAL EXAMINATION
Zhanna YL:  VITALS: Initial triage and subsequent vitals have been reviewed by me.  GEN APPEARANCE: WDWN, alert and cooperative, non-toxic appearing and in NAD  HEAD: Atraumatic, normocephalic   EYES: PERRLa, EOMI, vision grossly intact.   EARS: Gross hearing intact.   NOSE: No nasal discharge, no external evidence of epistaxis.   NECK: Supple  CV: RRR, S1S2, no c/r/m/g. No cyanosis or pallor. Extremities warm, well perfused. Cap refill <2 seconds. No bruits.   LUNGS: CTAB. No wheezing. No rales. No rhonchi. No diminished breath sounds.   ABDOMEN: Soft, NTND. No guarding or rebound. No masses.   MSK/EXT: Spine appears normal, no spine point tenderness. No CVA ttp. Normal muscular development. Pelvis stable. No obvious joint or bony deformity, no peripheral edema.   NEURO: Alert, follows commands. Weight bearing normal. Speech normal. Sensation and motor normal x4 extremities.   SKIN: Normal color for race, warm, dry and intact. No evidence of rash. 1cm lac to forehead x3 sutures in place healing well.   PSYCH: Normal mood and affect.

## 2021-11-26 ENCOUNTER — RX RENEWAL (OUTPATIENT)
Age: 81
End: 2021-11-26

## 2021-11-29 ENCOUNTER — APPOINTMENT (OUTPATIENT)
Dept: HEART AND VASCULAR | Facility: CLINIC | Age: 81
End: 2021-11-29
Payer: MEDICARE

## 2021-11-29 VITALS
DIASTOLIC BLOOD PRESSURE: 70 MMHG | HEIGHT: 70 IN | RESPIRATION RATE: 16 BRPM | OXYGEN SATURATION: 96 % | TEMPERATURE: 93.6 F | SYSTOLIC BLOOD PRESSURE: 120 MMHG | WEIGHT: 172 LBS | BODY MASS INDEX: 24.62 KG/M2 | HEART RATE: 50 BPM

## 2021-11-29 DIAGNOSIS — R19.7 DIARRHEA, UNSPECIFIED: ICD-10-CM

## 2021-11-29 PROCEDURE — 99214 OFFICE O/P EST MOD 30 MIN: CPT

## 2021-11-29 PROCEDURE — 93000 ELECTROCARDIOGRAM COMPLETE: CPT

## 2021-11-29 NOTE — HISTORY OF PRESENT ILLNESS
[FreeTextEntry1] : f/u MR, enlarged ao\par feels well. started power walking every day\par no angina or dyspnea\par BP has been well controlled\par has had very watery diarrhea for last 3-4 days\par no fever or abd pain and otherwise feels well

## 2021-11-30 LAB
ANION GAP SERPL CALC-SCNC: 17 MMOL/L
BASOPHILS # BLD AUTO: 0.04 K/UL
BASOPHILS NFR BLD AUTO: 0.6 %
BUN SERPL-MCNC: 12 MG/DL
CALCIUM SERPL-MCNC: 9.2 MG/DL
CHLORIDE SERPL-SCNC: 101 MMOL/L
CO2 SERPL-SCNC: 18 MMOL/L
CREAT SERPL-MCNC: 0.76 MG/DL
CRP SERPL HS-MCNC: 4.11 MG/L
EOSINOPHIL # BLD AUTO: 0.09 K/UL
EOSINOPHIL NFR BLD AUTO: 1.4 %
GLUCOSE SERPL-MCNC: 92 MG/DL
HCT VFR BLD CALC: 47.3 %
HGB BLD-MCNC: 15.8 G/DL
IMM GRANULOCYTES NFR BLD AUTO: 0.3 %
LYMPHOCYTES # BLD AUTO: 1.13 K/UL
LYMPHOCYTES NFR BLD AUTO: 17.5 %
MAGNESIUM SERPL-MCNC: 2.2 MG/DL
MAN DIFF?: NORMAL
MCHC RBC-ENTMCNC: 32.1 PG
MCHC RBC-ENTMCNC: 33.4 GM/DL
MCV RBC AUTO: 96.1 FL
MONOCYTES # BLD AUTO: 0.61 K/UL
MONOCYTES NFR BLD AUTO: 9.5 %
NEUTROPHILS # BLD AUTO: 4.55 K/UL
NEUTROPHILS NFR BLD AUTO: 70.7 %
PLATELET # BLD AUTO: 214 K/UL
POTASSIUM SERPL-SCNC: 4.1 MMOL/L
RBC # BLD: 4.92 M/UL
RBC # FLD: 14.1 %
SODIUM SERPL-SCNC: 135 MMOL/L
WBC # FLD AUTO: 6.44 K/UL

## 2022-01-01 ENCOUNTER — RX RENEWAL (OUTPATIENT)
Age: 82
End: 2022-01-01

## 2022-01-07 ENCOUNTER — APPOINTMENT (OUTPATIENT)
Dept: HEART AND VASCULAR | Facility: CLINIC | Age: 82
End: 2022-01-07

## 2022-01-13 NOTE — ED ADULT NURSE NOTE - NS ED NURSE DC PT EDUCATION RESOURCES
PRINCIPAL DISCHARGE DIAGNOSIS  Diagnosis: Open wound of abdominal wall without penetration into peritoneal cavity  Assessment and Plan of Treatment: seen by surgical team and wound care team.  Wound addressed by team.  Wound vac placed.  To be discharged on wound vac and follow up with wound care within 1 week.      SECONDARY DISCHARGE DIAGNOSES  Diagnosis: COVID  Assessment and Plan of Treatment: You were symptomatic requiring oxygen.  You received monoclonal antibodies infusion and remdesivir.  Saturating well on room air now.  Follow up with primary care doctor for monitoring.    
Yes

## 2022-02-18 LAB
ALBUMIN SERPL ELPH-MCNC: 4.5 G/DL
ALP BLD-CCNC: 66 U/L
ALT SERPL-CCNC: 22 U/L
ANION GAP SERPL CALC-SCNC: 10 MMOL/L
AST SERPL-CCNC: 21 U/L
BASOPHILS # BLD AUTO: 0.02 K/UL
BASOPHILS NFR BLD AUTO: 0.4 %
BILIRUB SERPL-MCNC: 0.4 MG/DL
BUN SERPL-MCNC: 16 MG/DL
CALCIUM SERPL-MCNC: 9.2 MG/DL
CHLORIDE SERPL-SCNC: 104 MMOL/L
CHOLEST SERPL-MCNC: 151 MG/DL
CO2 SERPL-SCNC: 25 MMOL/L
CREAT SERPL-MCNC: 0.8 MG/DL
CRP SERPL-MCNC: <3 MG/L
EOSINOPHIL # BLD AUTO: 0.09 K/UL
EOSINOPHIL NFR BLD AUTO: 1.6 %
FERRITIN SERPL-MCNC: 36 NG/ML
GLUCOSE SERPL-MCNC: 91 MG/DL
HCT VFR BLD CALC: 43.9 %
HDLC SERPL-MCNC: 50 MG/DL
HGB BLD-MCNC: 14.8 G/DL
IMM GRANULOCYTES NFR BLD AUTO: 0.4 %
IRON SATN MFR SERPL: 19 %
IRON SERPL-MCNC: 59 UG/DL
LDLC SERPL CALC-MCNC: 88 MG/DL
LYMPHOCYTES # BLD AUTO: 0.93 K/UL
LYMPHOCYTES NFR BLD AUTO: 16.6 %
MAN DIFF?: NORMAL
MCHC RBC-ENTMCNC: 31 PG
MCHC RBC-ENTMCNC: 33.7 GM/DL
MCV RBC AUTO: 91.8 FL
MONOCYTES # BLD AUTO: 0.43 K/UL
MONOCYTES NFR BLD AUTO: 7.7 %
NEUTROPHILS # BLD AUTO: 4.1 K/UL
NEUTROPHILS NFR BLD AUTO: 73.3 %
NONHDLC SERPL-MCNC: 101 MG/DL
PLATELET # BLD AUTO: 164 K/UL
POTASSIUM SERPL-SCNC: 4.3 MMOL/L
PROT SERPL-MCNC: 6.3 G/DL
RBC # BLD: 4.78 M/UL
RBC # FLD: 13.5 %
SODIUM SERPL-SCNC: 139 MMOL/L
TIBC SERPL-MCNC: 308 UG/DL
TRIGL SERPL-MCNC: 66 MG/DL
UIBC SERPL-MCNC: 249 UG/DL
WBC # FLD AUTO: 5.59 K/UL

## 2022-02-22 LAB
ESTIMATED AVERAGE GLUCOSE: 108 MG/DL
HBA1C MFR BLD HPLC: 5.4 %

## 2022-02-24 ENCOUNTER — APPOINTMENT (OUTPATIENT)
Dept: NEUROLOGY | Facility: CLINIC | Age: 82
End: 2022-02-24
Payer: MEDICARE

## 2022-02-24 DIAGNOSIS — G50.0 TRIGEMINAL NEURALGIA: ICD-10-CM

## 2022-02-24 PROCEDURE — 99214 OFFICE O/P EST MOD 30 MIN: CPT | Mod: 95

## 2022-02-25 ENCOUNTER — APPOINTMENT (OUTPATIENT)
Dept: UROLOGY | Facility: CLINIC | Age: 82
End: 2022-02-25
Payer: MEDICARE

## 2022-02-25 ENCOUNTER — RX RENEWAL (OUTPATIENT)
Age: 82
End: 2022-02-25

## 2022-02-25 VITALS
TEMPERATURE: 98.2 F | DIASTOLIC BLOOD PRESSURE: 76 MMHG | WEIGHT: 165 LBS | SYSTOLIC BLOOD PRESSURE: 135 MMHG | BODY MASS INDEX: 23.62 KG/M2 | HEART RATE: 70 BPM | RESPIRATION RATE: 14 BRPM | HEIGHT: 70 IN

## 2022-02-25 LAB
BILIRUB UR QL STRIP: NORMAL
CLARITY UR: CLEAR
COLLECTION METHOD: NORMAL
GLUCOSE UR-MCNC: NORMAL
HCG UR QL: 0.2 EU/DL
HGB UR QL STRIP.AUTO: NORMAL
KETONES UR-MCNC: NORMAL
LEUKOCYTE ESTERASE UR QL STRIP: NORMAL
NITRITE UR QL STRIP: NORMAL
PH UR STRIP: 5.5
PROT UR STRIP-MCNC: NORMAL
SP GR UR STRIP: 1.02

## 2022-02-25 PROCEDURE — 81003 URINALYSIS AUTO W/O SCOPE: CPT | Mod: QW

## 2022-02-25 PROCEDURE — 76857 US EXAM PELVIC LIMITED: CPT

## 2022-02-25 PROCEDURE — 99214 OFFICE O/P EST MOD 30 MIN: CPT

## 2022-02-25 NOTE — ASSESSMENT
[FreeTextEntry1] : I discussed the findings and options with . KENAN PIRES in detail. He is doing well urologically on the tamsulosin and tadalafil daily and does not want to pursue additional intervention at this time.\par \par Providing that there are no new problems, I look forward to seeing Mr. Pires in 1 year (bladder sonogram).  We are no longer monitoring PSAs.\par \par

## 2022-02-25 NOTE — ADDENDUM
[FreeTextEntry1] : A portion of this note was written by [Holden Pelaez] on 02/25/2022 acting as a scribe for Dr. Griffin. \par \par I have personally reviewed the chart and agree that the record accurately reflects my personal performance of the history, physical exam, assessment, and plan.

## 2022-02-25 NOTE — PHYSICAL EXAM
[General Appearance - Well Developed] : well developed [General Appearance - Well Nourished] : well nourished [Normal Appearance] : normal appearance [Well Groomed] : well groomed [General Appearance - In No Acute Distress] : no acute distress [Abdomen Soft] : soft [Abdomen Tenderness] : non-tender [Abdomen Mass (___ Cm)] : no abdominal mass palpated [Abdomen Hernia] : no hernia was discovered [Costovertebral Angle Tenderness] : no ~M costovertebral angle tenderness [Urethral Meatus] : meatus normal [Penis Abnormality] : normal circumcised penis [Urinary Bladder Findings] : the bladder was normal on palpation [Scrotum] : the scrotum was normal [Testes Tenderness] : no tenderness of the testes [Testes Mass (___cm)] : there were no testicular masses [Prostate Tenderness] : the prostate was not tender [No Prostate Nodules] : no prostate nodules [Skin Color & Pigmentation] : normal skin color and pigmentation [Edema] : no peripheral edema [] : no respiratory distress [Respiration, Rhythm And Depth] : normal respiratory rhythm and effort [Exaggerated Use Of Accessory Muscles For Inspiration] : no accessory muscle use [Oriented To Time, Place, And Person] : oriented to person, place, and time [Affect] : the affect was normal [Mood] : the mood was normal [Not Anxious] : not anxious [Normal Station and Gait] : the gait and station were normal for the patient's age [No Focal Deficits] : no focal deficits [No Palpable Adenopathy] : no palpable adenopathy [Heart Rate And Rhythm] : Heart rate and rhythm were normal [FreeTextEntry1] : Bilateral hydroceles.  1 cm right epididymal cyst.

## 2022-02-25 NOTE — HISTORY OF PRESENT ILLNESS
[FreeTextEntry1] : Mr. KENAN PIRES comes in today for his annual urologic follow-up.  He reports moderate stable chronic lower urinary tract symptoms (obstructive and irritative) with nocturia x 2. He is continuing on tamsulosin 0.4mg and Cialis 5mg daily. (In the past he experienced dizziness with alfuzosin--but not tamsulosin).  He had a Greenlight laser prostatectomy in 2001 with Dr. Skinner.\par IPSS: 16/35\par Sono: 68cc PVR: 51cc prostate\par \par Mr. Pires reports some degree of erectile dysfunction and does not with to pursue treatment specifically for this as he does not have a partner.  He feels that the Cialis has allowed him to experience morning erections. He is experiencing difficulty ejaculating, which is age related and exacerbated by the alpha blocker. \par \par PSA: 5/19/17--1.45; 11/5/13--1.0

## 2022-03-02 NOTE — ED PROVIDER NOTE - BIRTH SEX
March 2, 2022     Patient: Magnus Reyna   YOB: 2012   Date of Visit: 3/2/2022       To Whom it May Concern:    Magnus Reyna is under my professional care  He was seen in my office on 3/2/2022  He is a patient of our practice  He resides at [address] [place] [address]  If you have any questions or concerns, please don't hesitate to call           Sincerely,        Ghanshyam Weber MD Male

## 2022-03-04 ENCOUNTER — NON-APPOINTMENT (OUTPATIENT)
Age: 82
End: 2022-03-04

## 2022-03-16 ENCOUNTER — APPOINTMENT (OUTPATIENT)
Dept: DERMATOLOGY | Facility: CLINIC | Age: 82
End: 2022-03-16
Payer: MEDICARE

## 2022-03-16 DIAGNOSIS — L57.0 ACTINIC KERATOSIS: ICD-10-CM

## 2022-03-16 PROCEDURE — 99213 OFFICE O/P EST LOW 20 MIN: CPT

## 2022-03-16 NOTE — HISTORY OF PRESENT ILLNESS
[FreeTextEntry1] : pruritus [de-identified] : pruritus\par generalized\par very bothersome\par having trouble sleeping\par yrs ago - gabapentin high dose (2400) did help\par neurologist gave him gabapentin - at 1200 now\par helping mostly\par still with itching\par uses Cerave Anti Itch feels hard to apply as the itch wanders\par \par also with AK's (hx) and dark spots on face

## 2022-03-16 NOTE — ASSESSMENT
[FreeTextEntry1] : AK's\par discussed tx options\par I feel PDT would be a good option for him\par pt agrees\par will try to authorize\par \par Pruritus\par c/w gabapentin per neuro\par if not helping anymore we might consider this as a form of eczema and consider Dupixent

## 2022-03-28 ENCOUNTER — NON-APPOINTMENT (OUTPATIENT)
Age: 82
End: 2022-03-28

## 2022-03-29 ENCOUNTER — NON-APPOINTMENT (OUTPATIENT)
Age: 82
End: 2022-03-29

## 2022-04-12 ENCOUNTER — APPOINTMENT (OUTPATIENT)
Dept: DERMATOLOGY | Facility: CLINIC | Age: 82
End: 2022-04-12
Payer: MEDICARE

## 2022-04-12 PROCEDURE — 96567 PDT DSTR PRMLG LES SKN: CPT

## 2022-04-12 PROCEDURE — 96920 EXCIMER LSR PSRIASIS<250SQCM: CPT

## 2022-05-11 ENCOUNTER — NON-APPOINTMENT (OUTPATIENT)
Age: 82
End: 2022-05-11

## 2022-05-16 ENCOUNTER — APPOINTMENT (OUTPATIENT)
Dept: HEART AND VASCULAR | Facility: CLINIC | Age: 82
End: 2022-05-16

## 2022-05-18 ENCOUNTER — APPOINTMENT (OUTPATIENT)
Dept: DERMATOLOGY | Facility: CLINIC | Age: 82
End: 2022-05-18
Payer: MEDICARE

## 2022-05-18 DIAGNOSIS — L29.9 PRURITUS, UNSPECIFIED: ICD-10-CM

## 2022-05-18 PROCEDURE — 99214 OFFICE O/P EST MOD 30 MIN: CPT

## 2022-05-18 NOTE — HISTORY OF PRESENT ILLNESS
[FreeTextEntry1] : pruritus [de-identified] : pruritus\par better now for the most part on gabapentin 1800\par but just on the lower mid back - still has a lot of itching in that one area\par itching comes out at night in bed mostly - brushes lightly in the area seems to help\par wants to get off the gabapentin

## 2022-05-19 NOTE — END OF VISIT
[>50% of the face to face encounter time was spent on counseling and/or coordination of care for ___] : Greater than 50% of the face to face encounter time was spent on counseling and/or coordination of care for [unfilled]
[>50% of the face to face encounter time was spent on counseling and/or coordination of care for ___] : Greater than 50% of the face to face encounter time was spent on counseling and/or coordination of care for [unfilled]
750

## 2022-05-24 ENCOUNTER — APPOINTMENT (OUTPATIENT)
Dept: HEART AND VASCULAR | Facility: CLINIC | Age: 82
End: 2022-05-24
Payer: MEDICARE

## 2022-05-24 VITALS
WEIGHT: 177 LBS | TEMPERATURE: 97 F | OXYGEN SATURATION: 99 % | HEART RATE: 46 BPM | BODY MASS INDEX: 25.34 KG/M2 | SYSTOLIC BLOOD PRESSURE: 124 MMHG | DIASTOLIC BLOOD PRESSURE: 60 MMHG | HEIGHT: 70 IN

## 2022-05-24 DIAGNOSIS — R00.1 BRADYCARDIA, UNSPECIFIED: ICD-10-CM

## 2022-05-24 PROCEDURE — 99213 OFFICE O/P EST LOW 20 MIN: CPT

## 2022-05-24 PROCEDURE — 93000 ELECTROCARDIOGRAM COMPLETE: CPT

## 2022-05-24 RX ORDER — METFORMIN HYDROCHLORIDE 500 MG/1
500 TABLET, FILM COATED, EXTENDED RELEASE ORAL
Qty: 30 | Refills: 0 | Status: DISCONTINUED | COMMUNITY
Start: 2022-05-13 | End: 2022-05-24

## 2022-05-24 NOTE — HISTORY OF PRESENT ILLNESS
[FreeTextEntry1] : f/u MR, enlarged ao\par concerned about low HR as well\par no lightheaded, no syncope\par feels well. started power walking every day\par no angina or dyspnea\par BP has been well controlled\par \par

## 2022-05-25 ENCOUNTER — RX RENEWAL (OUTPATIENT)
Age: 82
End: 2022-05-25

## 2022-06-02 ENCOUNTER — APPOINTMENT (OUTPATIENT)
Dept: DERMATOLOGY | Facility: CLINIC | Age: 82
End: 2022-06-02
Payer: MEDICARE

## 2022-06-02 PROCEDURE — 99213 OFFICE O/P EST LOW 20 MIN: CPT

## 2022-06-13 ENCOUNTER — NON-APPOINTMENT (OUTPATIENT)
Age: 82
End: 2022-06-13

## 2022-06-13 ENCOUNTER — EMERGENCY (EMERGENCY)
Facility: HOSPITAL | Age: 82
LOS: 1 days | Discharge: HOME | End: 2022-06-13
Attending: EMERGENCY MEDICINE | Admitting: EMERGENCY MEDICINE
Payer: MEDICARE

## 2022-06-13 VITALS
HEART RATE: 55 BPM | TEMPERATURE: 98 F | HEIGHT: 70 IN | SYSTOLIC BLOOD PRESSURE: 146 MMHG | RESPIRATION RATE: 18 BRPM | OXYGEN SATURATION: 100 % | DIASTOLIC BLOOD PRESSURE: 50 MMHG

## 2022-06-13 LAB
APPEARANCE UR: CLEAR — SIGNIFICANT CHANGE UP
BILIRUB UR-MCNC: NEGATIVE — SIGNIFICANT CHANGE UP
COLOR SPEC: SIGNIFICANT CHANGE UP
DIFF PNL FLD: NEGATIVE — SIGNIFICANT CHANGE UP
GLUCOSE UR QL: NEGATIVE — SIGNIFICANT CHANGE UP
KETONES UR-MCNC: NEGATIVE — SIGNIFICANT CHANGE UP
LEUKOCYTE ESTERASE UR-ACNC: NEGATIVE — SIGNIFICANT CHANGE UP
NITRITE UR-MCNC: NEGATIVE — SIGNIFICANT CHANGE UP
PH UR: 6 — SIGNIFICANT CHANGE UP (ref 5–8)
PROT UR-MCNC: NEGATIVE — SIGNIFICANT CHANGE UP
SP GR SPEC: 1.01 — SIGNIFICANT CHANGE UP (ref 1–1.05)
UROBILINOGEN FLD QL: SIGNIFICANT CHANGE UP

## 2022-06-13 PROCEDURE — 99284 EMERGENCY DEPT VISIT MOD MDM: CPT

## 2022-06-13 NOTE — ED PROVIDER NOTE - NS ED ATTENDING STATEMENT MOD
This was a shared visit with the SEBASTIÁN. I reviewed and verified the documentation and independently performed the documented:

## 2022-06-13 NOTE — ED ADULT TRIAGE NOTE - CHIEF COMPLAINT QUOTE
pt c/o urinary retention. states only dribbling small amount of urine. denies pain/discomfort/dysuria/fever/chills.

## 2022-06-13 NOTE — ED ADULT NURSE NOTE - OBJECTIVE STATEMENT
Break RN note- Patient arrives to the ED for urinary retention. Patient able to provide a urine sample. Patient appears comfortable. No acute distress. Patient able to ambulate. Patient left before further assessment could be completed.

## 2022-06-13 NOTE — ED PROVIDER NOTE - OBJECTIVE STATEMENT
Patient is an 81 year old male with a PMHx of mitral valve regurgitation, descending aortic aneurysm, and BPH here due to urinary retention. Patient states that  was only able to produce small amounts of urine in a dribbling stream this morning, however at approximately 3pm urinary stream returned to normal. Patient reports a visit with his urologist a couple months ago at which point he was told exam findings were normal, reports biannual visits with his urologist regularly. Denies fever, chills,  hematuria, dysuria, N/V/D. Patient is an 81 year old male with a PMHx of mitral valve regurgitation, descending aortic aneurysm, and BPH here due to urinary retention. Patient states that  was only able to produce small amounts of urine in a dribbling stream this morning, however at approximately 3pm urinary stream returned to normal. Patient reports a visit with his urologist a couple months ago at which point he was told exam findings were normal, admits to biannual visits with his urologist regularly. Denies fever, chills,  hematuria, dysuria, N/V/D.

## 2022-06-13 NOTE — ED PROVIDER NOTE - ATTENDING APP SHARED VISIT CONTRIBUTION OF CARE
81 year old with decreased urine output since this am.  however, voided will waiting for room. will check bedside sono for residual urine. will send ua/culture for possible infection as cause. uro fu and return precautions

## 2022-06-13 NOTE — ED PROVIDER NOTE - CLINICAL SUMMARY MEDICAL DECISION MAKING FREE TEXT BOX
Patient is an 81 year old male with a PMHx of mitral valve regurgitation, descending aortic aneurysm, and BPH here due to urinary retention. Patient states that  was only able to produce small amounts of urine in a dribbling stream this morning, however at approximately 3pm urinary stream returned to normal. Patient reports a visit with his urologist a couple months ago at which point he was told exam findings were normal, reports biannual visits with his urologist regularly. Denies fever, chills, hematuria, dysuria, N/V/D. Plan UA, bladder us, re-assess. Patient is an 81 year old male with a PMHx of mitral valve regurgitation, descending aortic aneurysm, and BPH here due to urinary retention. Patient states that  was only able to produce small amounts of urine in a dribbling stream this morning, however at approximately 3pm urinary stream returned to normal. Patient reports a visit with his urologist a couple months ago at which point he was told exam findings were normal, admits to biannual visits with his urologist regularly. Denies fever, chills, hematuria, dysuria, N/V/D. Plan UA, bladder us, re-assess.

## 2022-06-13 NOTE — ED PROVIDER NOTE - PATIENT PORTAL LINK FT
No You can access the FollowMyHealth Patient Portal offered by [facility] by registering at the following website: [email or web address]. By joining [facility]’s FollowMyHealth portal, you will also be able to view your health information using other applications (apps) compatible with our system.

## 2022-06-14 LAB
CULTURE RESULTS: SIGNIFICANT CHANGE UP
SPECIMEN SOURCE: SIGNIFICANT CHANGE UP

## 2022-07-11 NOTE — ED ADULT TRIAGE NOTE - HEART RATE (BEATS/MIN)
"  Subjective:     Celso Emmanuel  is a 10 y.o. male who presents for Annual Exam (Football )       Patient is a 11yo male who comes in with mom for a sports physical for football.  He has played previously.  . No major medical history, no chronic conditions, no chronic medications. No history of asthma, heart murmur, heart disease, seizure disorder or syncopal episodes with activity. No hx of concussion. No family hx. Of sudden cardiac death or known HCM.    Review of Systems   HENT:        Prior incisor tooth chip - seen by dentist   Cardiovascular:        Heart murmur as infant, cleared up prior to age 1   All other systems reviewed and are negative.   No Known Allergies  Past Medical History:   Diagnosis Date   • Adhesions of foreskin 11/25/2013   • Bronchiolitis 11/25/2013   • Cold 3/3/14    dirrhea, fever and  cough   • Murmur 11/25/2013    mother states its closed        Objective:   Pulse 108   Temp 36.1 °C (97 °F) (Temporal)   Resp 24   Ht 1.6 m (5' 3\")   Wt 69.1 kg (152 lb 6.4 oz)   SpO2 95%   BMI 27.00 kg/m²   Physical Exam  Vitals and nursing note reviewed.   Constitutional:       General: He is active. He is not in acute distress.     Appearance: Normal appearance. He is not toxic-appearing.   HENT:      Head: Normocephalic and atraumatic.      Right Ear: Tympanic membrane normal. There is no impacted cerumen. Tympanic membrane is not erythematous.      Left Ear: Tympanic membrane normal. There is no impacted cerumen. Tympanic membrane is not erythematous.      Nose: Nose normal. No rhinorrhea.      Mouth/Throat:      Mouth: Mucous membranes are moist.      Pharynx: No oropharyngeal exudate or posterior oropharyngeal erythema.   Eyes:      General:         Right eye: No discharge.         Left eye: No discharge.      Extraocular Movements: Extraocular movements intact.      Pupils: Pupils are equal, round, and reactive to light.   Cardiovascular:      Rate and Rhythm: Normal rate and regular " rhythm.      Heart sounds: Normal heart sounds. No murmur heard.     Comments: No provoked murmur with squatting and standing.  Pulmonary:      Effort: Pulmonary effort is normal. No respiratory distress.      Breath sounds: Normal breath sounds. No wheezing.   Abdominal:      General: Abdomen is flat. There is no distension.      Palpations: Abdomen is soft. There is no mass.      Tenderness: There is no abdominal tenderness. There is no guarding.   Genitourinary:     Comments: Patient refused exam, mom ok with his refusal.  Musculoskeletal:         General: No swelling or deformity. Normal range of motion.      Cervical back: Normal range of motion and neck supple. No tenderness.   Lymphadenopathy:      Cervical: No cervical adenopathy.   Skin:     General: Skin is warm and dry.      Capillary Refill: Capillary refill takes less than 2 seconds.      Findings: No rash.   Neurological:      General: No focal deficit present.      Mental Status: He is alert and oriented for age.      Cranial Nerves: No cranial nerve deficit.      Motor: No weakness.      Coordination: Coordination normal.      Gait: Gait normal.      Deep Tendon Reflexes: Reflexes normal.      Comments: normal finger-nose-finger,  normal tandem and heel and toe gait.  Trace DTR symmetric in biceps and patellar.   Psychiatric:         Mood and Affect: Mood normal.         Behavior: Behavior normal.         Thought Content: Thought content normal.          See scanned form for additional      Assessment/Plan:     Encounter Diagnoses   Name Primary?   • Routine sports physical exam      See scanned sports physical and health questionnaire. No PMH/FH congenital cardiac. No PMH concussion. Exam normal.   Cleared to participate without restrictions.    See AVS Instructions below for written guidance provided to patient on after-visit management and care in addition to our verbal discussion during the visit.    Please note that this dictation was created  using voice recognition software. I have attempted to correct all errors, but there may be sound-alike, spelling, grammar and possibly content errors that I did not discover before finalizing the note.     63

## 2022-09-01 ENCOUNTER — APPOINTMENT (OUTPATIENT)
Dept: DERMATOLOGY | Facility: CLINIC | Age: 82
End: 2022-09-01

## 2022-09-01 DIAGNOSIS — L20.89 OTHER ATOPIC DERMATITIS: ICD-10-CM

## 2022-09-01 DIAGNOSIS — L29.8 OTHER PRURITUS: ICD-10-CM

## 2022-09-01 PROCEDURE — 99213 OFFICE O/P EST LOW 20 MIN: CPT

## 2022-09-22 ENCOUNTER — APPOINTMENT (OUTPATIENT)
Dept: DERMATOLOGY | Facility: CLINIC | Age: 82
End: 2022-09-22

## 2022-09-22 ENCOUNTER — LABORATORY RESULT (OUTPATIENT)
Age: 82
End: 2022-09-22

## 2022-09-22 DIAGNOSIS — L98.9 DISORDER OF THE SKIN AND SUBCUTANEOUS TISSUE, UNSPECIFIED: ICD-10-CM

## 2022-09-22 DIAGNOSIS — L01.00 IMPETIGO, UNSPECIFIED: ICD-10-CM

## 2022-09-22 PROCEDURE — 99214 OFFICE O/P EST MOD 30 MIN: CPT

## 2022-09-22 NOTE — HISTORY OF PRESENT ILLNESS
[FreeTextEntry1] : F/u spot on nose [de-identified] : Mr. Wilkes is an 82yoM presenting for f/u erosion on L side of his nose, last seen 3 weeks ago. Since then applying vaseline 3x daily with improvement, no more crusting. \par \par Also notes rash on left 3rd interweb space x 8 days- very painful possible fluid filled bumps, then applied triple abx cream, less painful but very weepy. Minimal pain now. Did not spread, not anywhere else, feels well, no f/c. No possible exposure to poison ivy

## 2022-09-22 NOTE — PHYSICAL EXAM
[Alert] : alert [Well Nourished] : well nourished [Conjunctiva Non-injected] : conjunctiva non-injected [FreeTextEntry3] : 2-3mm hemorrhagic crust on L nasal ala with underlying smooth pink erosion

## 2022-09-22 NOTE — ASSESSMENT
[FreeTextEntry1] : # Erosion of L nasal ala, favor excoriation/benign erosion\par - C/w vaseline to area BID\par - If not completely healed over at next visit, will biopsy. 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\par new diagnosis with uncertain prognosis\par - may also have underlying component of contact dermatitis\par - education, counseling\par - swab for HSV/VZV taken after unroofing a pustule, swab for bacterial culture also taken\par - stop triple abx cream\par - start mupirocin BID\par - start dilute vinegar warm water soaks for 10 mins daily\par - if HSV positive, will start on valtrex; if negative, after improvement on mupirocin, consider starting clobetasol BID (will call patient next week)\par \par Not addressed today:\par # Generalized pruritus\par - Currently on gabapentin 1800mg/night from neurologist; would like to taper in future if pruritus controlled\par - Received loading dose dupixent 6/2022, improved but still with itch primarily around mid-back. Recommend allowing another 2-3 months for dupixent to take full effect\par - Recommend OTC sarna lotion for itch, may continue with previously recommended cerave anti-itch cream as well\par \par RTC 1 month

## 2022-09-30 ENCOUNTER — NON-APPOINTMENT (OUTPATIENT)
Age: 82
End: 2022-09-30

## 2022-10-10 ENCOUNTER — NON-APPOINTMENT (OUTPATIENT)
Age: 82
End: 2022-10-10

## 2022-10-13 ENCOUNTER — LABORATORY RESULT (OUTPATIENT)
Age: 82
End: 2022-10-13

## 2022-10-14 ENCOUNTER — APPOINTMENT (OUTPATIENT)
Dept: DERMATOLOGY | Facility: CLINIC | Age: 82
End: 2022-10-14

## 2022-10-14 ENCOUNTER — LABORATORY RESULT (OUTPATIENT)
Age: 82
End: 2022-10-14

## 2022-10-14 DIAGNOSIS — L29.9 PRURITUS, UNSPECIFIED: ICD-10-CM

## 2022-10-14 PROCEDURE — 99214 OFFICE O/P EST MOD 30 MIN: CPT | Mod: 25

## 2022-10-14 PROCEDURE — 11102 TANGNTL BX SKIN SINGLE LES: CPT

## 2022-10-20 ENCOUNTER — NON-APPOINTMENT (OUTPATIENT)
Age: 82
End: 2022-10-20

## 2022-10-27 ENCOUNTER — APPOINTMENT (OUTPATIENT)
Dept: DERMATOLOGY | Facility: CLINIC | Age: 82
End: 2022-10-27

## 2022-10-31 ENCOUNTER — NON-APPOINTMENT (OUTPATIENT)
Age: 82
End: 2022-10-31

## 2022-11-09 ENCOUNTER — APPOINTMENT (OUTPATIENT)
Dept: DERMATOLOGY | Facility: CLINIC | Age: 82
End: 2022-11-09

## 2022-11-09 PROCEDURE — 99214 OFFICE O/P EST MOD 30 MIN: CPT

## 2022-11-17 ENCOUNTER — NON-APPOINTMENT (OUTPATIENT)
Age: 82
End: 2022-11-17

## 2022-11-21 ENCOUNTER — NON-APPOINTMENT (OUTPATIENT)
Age: 82
End: 2022-11-21

## 2022-12-08 ENCOUNTER — APPOINTMENT (OUTPATIENT)
Dept: HEART AND VASCULAR | Facility: CLINIC | Age: 82
End: 2022-12-08

## 2022-12-08 VITALS
HEART RATE: 57 BPM | HEIGHT: 70 IN | WEIGHT: 180 LBS | TEMPERATURE: 98 F | BODY MASS INDEX: 25.77 KG/M2 | DIASTOLIC BLOOD PRESSURE: 72 MMHG | OXYGEN SATURATION: 96 % | SYSTOLIC BLOOD PRESSURE: 120 MMHG

## 2022-12-08 DIAGNOSIS — R00.2 PALPITATIONS: ICD-10-CM

## 2022-12-08 PROCEDURE — 99215 OFFICE O/P EST HI 40 MIN: CPT

## 2022-12-08 NOTE — REASON FOR VISIT
[Arrhythmia/ECG Abnorrmalities] : arrhythmia/ECG abnormalities [Hypertension] : hypertension [Carotid, Aortic and Peripheral Vascular Disease] : carotid, aortic and peripheral vascular disease

## 2022-12-08 NOTE — DISCUSSION/SUMMARY
[FreeTextEntry1] : stable exam\par palpiations/P svt- check zio patch/ f/u est\par HTN stable on meds\par enlarged TA MR- f/u for echo eval\par chart reviewed, prior studies reviewed
- - -

## 2022-12-12 ENCOUNTER — APPOINTMENT (OUTPATIENT)
Dept: DERMATOLOGY | Facility: CLINIC | Age: 82
End: 2022-12-12

## 2022-12-12 DIAGNOSIS — R21 RASH AND OTHER NONSPECIFIC SKIN ERUPTION: ICD-10-CM

## 2022-12-12 PROCEDURE — 99214 OFFICE O/P EST MOD 30 MIN: CPT

## 2022-12-28 ENCOUNTER — APPOINTMENT (OUTPATIENT)
Dept: HEART AND VASCULAR | Facility: CLINIC | Age: 82
End: 2022-12-28

## 2022-12-28 VITALS
DIASTOLIC BLOOD PRESSURE: 74 MMHG | TEMPERATURE: 98 F | BODY MASS INDEX: 25.77 KG/M2 | SYSTOLIC BLOOD PRESSURE: 122 MMHG | HEART RATE: 60 BPM | RESPIRATION RATE: 12 BRPM | OXYGEN SATURATION: 97 % | WEIGHT: 180 LBS | HEIGHT: 70 IN

## 2022-12-28 PROCEDURE — 93306 TTE W/DOPPLER COMPLETE: CPT

## 2022-12-28 PROCEDURE — 93015 CV STRESS TEST SUPVJ I&R: CPT

## 2022-12-28 PROCEDURE — 99214 OFFICE O/P EST MOD 30 MIN: CPT | Mod: 25

## 2022-12-28 NOTE — DISCUSSION/SUMMARY
[FreeTextEntry1] : stable exam\par normal est results discussed/ apcs and vpcs\par zio patch discussed short run of svt\par HTN stable\par MR stable by echo\par TAA stable by echo\par results discussed

## 2022-12-28 NOTE — REASON FOR VISIT
[Symptom and Test Evaluation] : symptom and test evaluation [Structural Heart and Valve Disease] : structural heart and valve disease [Hypertension] : hypertension [Carotid, Aortic and Peripheral Vascular Disease] : carotid, aortic and peripheral vascular disease

## 2022-12-29 LAB
BASOPHILS # BLD AUTO: 0.03 K/UL
BASOPHILS NFR BLD AUTO: 0.6 %
EOSINOPHIL # BLD AUTO: 0.16 K/UL
EOSINOPHIL NFR BLD AUTO: 3 %
HCT VFR BLD CALC: 45.1 %
HGB BLD-MCNC: 15.2 G/DL
IMM GRANULOCYTES NFR BLD AUTO: 0 %
LYMPHOCYTES # BLD AUTO: 1.14 K/UL
LYMPHOCYTES NFR BLD AUTO: 21.2 %
MAN DIFF?: NORMAL
MCHC RBC-ENTMCNC: 31.9 PG
MCHC RBC-ENTMCNC: 33.7 GM/DL
MCV RBC AUTO: 94.7 FL
MONOCYTES # BLD AUTO: 0.5 K/UL
MONOCYTES NFR BLD AUTO: 9.3 %
NEUTROPHILS # BLD AUTO: 3.54 K/UL
NEUTROPHILS NFR BLD AUTO: 65.9 %
PLATELET # BLD AUTO: 148 K/UL
RBC # BLD: 4.76 M/UL
RBC # FLD: 13 %
WBC # FLD AUTO: 5.37 K/UL

## 2022-12-30 LAB
25(OH)D3 SERPL-MCNC: 44 NG/ML
ALBUMIN SERPL ELPH-MCNC: 4.5 G/DL
ALP BLD-CCNC: 55 U/L
ALT SERPL-CCNC: 25 U/L
ANION GAP SERPL CALC-SCNC: 12 MMOL/L
AST SERPL-CCNC: 27 U/L
BILIRUB SERPL-MCNC: 0.6 MG/DL
BUN SERPL-MCNC: 21 MG/DL
CALCIUM SERPL-MCNC: 9.2 MG/DL
CHLORIDE SERPL-SCNC: 101 MMOL/L
CO2 SERPL-SCNC: 26 MMOL/L
CREAT SERPL-MCNC: 0.82 MG/DL
EGFR: 88 ML/MIN/1.73M2
ERYTHROCYTE [SEDIMENTATION RATE] IN BLOOD BY WESTERGREN METHOD: 3 MM/HR
ESTIMATED AVERAGE GLUCOSE: 103 MG/DL
FERRITIN SERPL-MCNC: 60 NG/ML
GLUCOSE SERPL-MCNC: 90 MG/DL
HBA1C MFR BLD HPLC: 5.2 %
HCYS SERPL-MCNC: 8.3 UMOL/L
IRON SERPL-MCNC: 102 UG/DL
MAGNESIUM SERPL-MCNC: 2.1 MG/DL
NT-PROBNP SERPL-MCNC: 142 PG/ML
POTASSIUM SERPL-SCNC: 4.3 MMOL/L
PROT SERPL-MCNC: 6.2 G/DL
PSA SERPL-MCNC: 1.51 NG/ML
SODIUM SERPL-SCNC: 139 MMOL/L
TSH SERPL-ACNC: 2.97 UIU/ML

## 2023-01-03 ENCOUNTER — NON-APPOINTMENT (OUTPATIENT)
Age: 83
End: 2023-01-03

## 2023-01-03 ENCOUNTER — APPOINTMENT (OUTPATIENT)
Dept: DERMATOLOGY | Facility: CLINIC | Age: 83
End: 2023-01-03
Payer: MEDICARE

## 2023-01-03 ENCOUNTER — APPOINTMENT (OUTPATIENT)
Dept: PLASTIC SURGERY | Facility: CLINIC | Age: 83
End: 2023-01-03
Payer: MEDICARE

## 2023-01-03 PROCEDURE — 17311 MOHS 1 STAGE H/N/HF/G: CPT

## 2023-01-03 PROCEDURE — 14060 TIS TRNFR E/N/E/L 10 SQ CM/<: CPT

## 2023-01-03 NOTE — PROCEDURE
[FreeTextEntry6] : Procedure Details: Preopdx: nasal deformity s/p mohs\par Procedure: flap elevation , 2x1cm advancement nose\par Anesthesia: local 1% w/epi\par Specimens: to path on formalin\par No complications\par \par Summary:\par IC obtained. Flap demarcated with marking pen. Anterior based flap designed. 1%lido with epinephrine injected. 15 blade used to incise full thickness. flap elevated in the supracartilaginous plane and advanced, 2x1cm and sewn into place with 4-0 monocryl suture. bacitracin placed.\par

## 2023-01-03 NOTE — REASON FOR VISIT
[Procedure: _________] : a [unfilled] procedure visit [FreeTextEntry1] : closure of defect and reconstruction left BCC on nasal area

## 2023-01-11 ENCOUNTER — APPOINTMENT (OUTPATIENT)
Dept: PLASTIC SURGERY | Facility: CLINIC | Age: 83
End: 2023-01-11
Payer: MEDICARE

## 2023-01-11 PROCEDURE — 99024 POSTOP FOLLOW-UP VISIT: CPT

## 2023-01-12 NOTE — ASSESSMENT
[FreeTextEntry1] : Incision site healing well. Incision site intact and well approximated. No bleeding. No s/sx infection, cellulitis, purulent discharge. No odor. No erythema or edema.\par \par Sutures removed without incident.\par Bacitracin applied to site. Continue Bacitracin three times per day x 2-3 days. Wash with soap and water daily.\par Apply aquafor  or vaseline BID \par RTC 4- 6 weeks scar check

## 2023-01-12 NOTE — HISTORY OF PRESENT ILLNESS
[FreeTextEntry1] : KENAN PIRES is being seen for a procedure visit, closure of defect and reconstruction left BCC on nasal area.  DOS: 1-3-23\par  No excessive bleeding. No fevers. No odor. No purulent discharge. No excessive pain.\par

## 2023-02-16 ENCOUNTER — APPOINTMENT (OUTPATIENT)
Dept: PLASTIC SURGERY | Facility: CLINIC | Age: 83
End: 2023-02-16
Payer: MEDICARE

## 2023-02-16 PROCEDURE — 99024 POSTOP FOLLOW-UP VISIT: CPT

## 2023-02-17 NOTE — HISTORY OF PRESENT ILLNESS
[FreeTextEntry1] : DOS: 1-3-23\par  S/P: closure of defect and reconstruction left BCC on nasal area. \par \par  No excessive bleeding. No fevers. No odor. No purulent discharge. No excessive pain.

## 2023-03-10 ENCOUNTER — APPOINTMENT (OUTPATIENT)
Dept: UROLOGY | Facility: CLINIC | Age: 83
End: 2023-03-10
Payer: MEDICARE

## 2023-03-10 VITALS
SYSTOLIC BLOOD PRESSURE: 122 MMHG | TEMPERATURE: 97 F | HEART RATE: 66 BPM | OXYGEN SATURATION: 96 % | DIASTOLIC BLOOD PRESSURE: 70 MMHG

## 2023-03-10 DIAGNOSIS — Z85.828 PERSONAL HISTORY OF OTHER MALIGNANT NEOPLASM OF SKIN: ICD-10-CM

## 2023-03-10 PROCEDURE — 51798 US URINE CAPACITY MEASURE: CPT

## 2023-03-10 PROCEDURE — 99215 OFFICE O/P EST HI 40 MIN: CPT

## 2023-03-10 RX ORDER — KETOCONAZOLE 20 MG/G
2 CREAM TOPICAL
Qty: 1 | Refills: 5 | Status: DISCONTINUED | COMMUNITY
Start: 2022-09-26 | End: 2023-03-10

## 2023-03-10 RX ORDER — TAMSULOSIN HYDROCHLORIDE 0.4 MG/1
0.4 CAPSULE ORAL
Qty: 90 | Refills: 3 | Status: DISCONTINUED | COMMUNITY
Start: 2020-07-13 | End: 2023-03-10

## 2023-03-10 RX ORDER — CLINDAMYCIN PHOSPHATE 10 MG/ML
1 SOLUTION TOPICAL TWICE DAILY
Qty: 1 | Refills: 2 | Status: DISCONTINUED | COMMUNITY
Start: 2021-09-24 | End: 2023-03-10

## 2023-03-10 RX ORDER — TADALAFIL 5 MG/1
5 TABLET ORAL
Qty: 90 | Refills: 2 | Status: DISCONTINUED | COMMUNITY
Start: 2023-02-13 | End: 2023-03-10

## 2023-03-10 RX ORDER — DUPILUMAB 300 MG/2ML
300 INJECTION, SOLUTION SUBCUTANEOUS
Qty: 1 | Refills: 3 | Status: DISCONTINUED | COMMUNITY
Start: 2022-06-02 | End: 2023-03-10

## 2023-03-10 RX ORDER — DUPILUMAB 300 MG/2ML
300 INJECTION, SOLUTION SUBCUTANEOUS
Qty: 1 | Refills: 11 | Status: DISCONTINUED | COMMUNITY
Start: 2022-05-18 | End: 2023-03-10

## 2023-03-10 RX ORDER — TADALAFIL 5 MG/1
5 TABLET ORAL
Qty: 90 | Refills: 3 | Status: DISCONTINUED | COMMUNITY
End: 2023-03-10

## 2023-03-10 RX ORDER — CLOBETASOL PROPIONATE 0.5 MG/G
0.05 OINTMENT TOPICAL
Qty: 1 | Refills: 0 | Status: DISCONTINUED | COMMUNITY
Start: 2022-09-22 | End: 2023-03-10

## 2023-03-10 RX ORDER — TADALAFIL 5 MG/1
5 TABLET ORAL
Qty: 90 | Refills: 3 | Status: DISCONTINUED | COMMUNITY
Start: 2022-04-07 | End: 2023-03-10

## 2023-03-10 RX ORDER — TADALAFIL 5 MG/1
5 TABLET ORAL
Qty: 90 | Refills: 3 | Status: DISCONTINUED | COMMUNITY
Start: 2022-07-08 | End: 2023-03-10

## 2023-03-10 RX ORDER — TACROLIMUS 1 MG/G
0.1 OINTMENT TOPICAL
Qty: 1 | Refills: 0 | Status: DISCONTINUED | COMMUNITY
Start: 2022-05-18 | End: 2023-03-10

## 2023-03-10 RX ORDER — TRIAMCINOLONE ACETONIDE 1 MG/G
0.1 OINTMENT TOPICAL TWICE DAILY
Qty: 1 | Refills: 1 | Status: DISCONTINUED | COMMUNITY
Start: 2022-05-18 | End: 2023-03-10

## 2023-03-10 NOTE — ASSESSMENT
[FreeTextEntry1] : I discussed the findings and options with . KENAN PIRES in detail. He is relatively stable urologically.  The increased nocturia can be managed with behavioral modification.  He will also continue on the daily tamsulosin and tadalafil. \par \par He understands that we do not follow PSAs in octogenarians.\par \par Providing that there are no new problems, I look forward to seeing Mr. Pires in 1 year (bladder sonogram).  \par \par

## 2023-03-10 NOTE — PHYSICAL EXAM

## 2023-03-10 NOTE — HISTORY OF PRESENT ILLNESS
[FreeTextEntry1] : Mr. KENAN PIRES comes in today for his annual urologic follow-up.  He reports moderate stable chronic lower urinary tract symptoms (obstructive and irritative) with nocturia x 3. He is continuing on tamsulosin 0.4mg and Cialis 5mg daily. (In the past he experienced dizziness with alfuzosin--but not tamsulosin).  He had a Greenlight laser prostatectomy in 2001 with Dr. Skinner.\par IPSS: 17/35\par Sono (performed to assess bladder emptying): 35cc PVR\par \par Mr. Pires reports some degree of erectile dysfunction and does not with to pursue treatment specifically for this as he does not have a partner.  He feels that the Cialis has allowed him to experience morning erections. He is experiencing difficulty ejaculating, which is age related and exacerbated by the alpha blocker. \par \par PSA: 12/29/22--1.51; 5/19/17--1.45; 11/5/13--1.0

## 2023-03-10 NOTE — ADDENDUM
[FreeTextEntry1] : A portion of this note was written by [Holden Pelaez] on 03/09/2023 acting as a scribe for Dr. Griffin. \par \par I have personally reviewed the chart and agree that the record accurately reflects my personal performance of the history, physical exam, assessment, and plan.

## 2023-04-05 NOTE — ED PROVIDER NOTE - NSDCPRINTRESULTS_ED_ALL_ED
Patient requests all Lab and Radiology Results on their Discharge Instructions minimum assist (75% patients effort)

## 2023-06-02 ENCOUNTER — RX RENEWAL (OUTPATIENT)
Age: 83
End: 2023-06-02

## 2023-07-09 ENCOUNTER — EMERGENCY (EMERGENCY)
Facility: HOSPITAL | Age: 83
LOS: 1 days | Discharge: HOME | End: 2023-07-09
Attending: EMERGENCY MEDICINE | Admitting: EMERGENCY MEDICINE
Payer: MEDICARE

## 2023-07-09 VITALS
SYSTOLIC BLOOD PRESSURE: 135 MMHG | DIASTOLIC BLOOD PRESSURE: 56 MMHG | RESPIRATION RATE: 18 BRPM | TEMPERATURE: 98 F | HEART RATE: 150 BPM | OXYGEN SATURATION: 100 %

## 2023-07-09 VITALS
DIASTOLIC BLOOD PRESSURE: 81 MMHG | SYSTOLIC BLOOD PRESSURE: 151 MMHG | TEMPERATURE: 98 F | HEART RATE: 69 BPM | OXYGEN SATURATION: 100 % | RESPIRATION RATE: 18 BRPM

## 2023-07-09 LAB
ALBUMIN SERPL ELPH-MCNC: 4.3 G/DL — SIGNIFICANT CHANGE UP (ref 3.3–5)
ALP SERPL-CCNC: 49 U/L — SIGNIFICANT CHANGE UP (ref 40–120)
ALT FLD-CCNC: 21 U/L — SIGNIFICANT CHANGE UP (ref 4–41)
ANION GAP SERPL CALC-SCNC: 11 MMOL/L — SIGNIFICANT CHANGE UP (ref 7–14)
AST SERPL-CCNC: 23 U/L — SIGNIFICANT CHANGE UP (ref 4–40)
BASOPHILS # BLD AUTO: 0.03 K/UL — SIGNIFICANT CHANGE UP (ref 0–0.2)
BASOPHILS NFR BLD AUTO: 0.5 % — SIGNIFICANT CHANGE UP (ref 0–2)
BILIRUB SERPL-MCNC: 0.3 MG/DL — SIGNIFICANT CHANGE UP (ref 0.2–1.2)
BUN SERPL-MCNC: 16 MG/DL — SIGNIFICANT CHANGE UP (ref 7–23)
CALCIUM SERPL-MCNC: 9.3 MG/DL — SIGNIFICANT CHANGE UP (ref 8.4–10.5)
CHLORIDE SERPL-SCNC: 103 MMOL/L — SIGNIFICANT CHANGE UP (ref 98–107)
CO2 SERPL-SCNC: 24 MMOL/L — SIGNIFICANT CHANGE UP (ref 22–31)
CREAT SERPL-MCNC: 0.66 MG/DL — SIGNIFICANT CHANGE UP (ref 0.5–1.3)
D DIMER BLD IA.RAPID-MCNC: <150 NG/ML DDU — SIGNIFICANT CHANGE UP
EGFR: 94 ML/MIN/1.73M2 — SIGNIFICANT CHANGE UP
EOSINOPHIL # BLD AUTO: 0.12 K/UL — SIGNIFICANT CHANGE UP (ref 0–0.5)
EOSINOPHIL NFR BLD AUTO: 2 % — SIGNIFICANT CHANGE UP (ref 0–6)
GLUCOSE SERPL-MCNC: 98 MG/DL — SIGNIFICANT CHANGE UP (ref 70–99)
HCT VFR BLD CALC: 42.9 % — SIGNIFICANT CHANGE UP (ref 39–50)
HGB BLD-MCNC: 14.3 G/DL — SIGNIFICANT CHANGE UP (ref 13–17)
IANC: 4.2 K/UL — SIGNIFICANT CHANGE UP (ref 1.8–7.4)
IMM GRANULOCYTES NFR BLD AUTO: 0.7 % — SIGNIFICANT CHANGE UP (ref 0–0.9)
LYMPHOCYTES # BLD AUTO: 1.11 K/UL — SIGNIFICANT CHANGE UP (ref 1–3.3)
LYMPHOCYTES # BLD AUTO: 18.7 % — SIGNIFICANT CHANGE UP (ref 13–44)
MCHC RBC-ENTMCNC: 31.6 PG — SIGNIFICANT CHANGE UP (ref 27–34)
MCHC RBC-ENTMCNC: 33.3 GM/DL — SIGNIFICANT CHANGE UP (ref 32–36)
MCV RBC AUTO: 94.9 FL — SIGNIFICANT CHANGE UP (ref 80–100)
MONOCYTES # BLD AUTO: 0.44 K/UL — SIGNIFICANT CHANGE UP (ref 0–0.9)
MONOCYTES NFR BLD AUTO: 7.4 % — SIGNIFICANT CHANGE UP (ref 2–14)
NEUTROPHILS # BLD AUTO: 4.2 K/UL — SIGNIFICANT CHANGE UP (ref 1.8–7.4)
NEUTROPHILS NFR BLD AUTO: 70.7 % — SIGNIFICANT CHANGE UP (ref 43–77)
NRBC # BLD: 0 /100 WBCS — SIGNIFICANT CHANGE UP (ref 0–0)
NRBC # FLD: 0 K/UL — SIGNIFICANT CHANGE UP (ref 0–0)
PLATELET # BLD AUTO: 133 K/UL — LOW (ref 150–400)
POTASSIUM SERPL-MCNC: 4.2 MMOL/L — SIGNIFICANT CHANGE UP (ref 3.5–5.3)
POTASSIUM SERPL-SCNC: 4.2 MMOL/L — SIGNIFICANT CHANGE UP (ref 3.5–5.3)
PROT SERPL-MCNC: 5.9 G/DL — LOW (ref 6–8.3)
RBC # BLD: 4.52 M/UL — SIGNIFICANT CHANGE UP (ref 4.2–5.8)
RBC # FLD: 12.9 % — SIGNIFICANT CHANGE UP (ref 10.3–14.5)
SODIUM SERPL-SCNC: 138 MMOL/L — SIGNIFICANT CHANGE UP (ref 135–145)
TROPONIN T, HIGH SENSITIVITY RESULT: 23 NG/L — SIGNIFICANT CHANGE UP
TROPONIN T, HIGH SENSITIVITY RESULT: 30 NG/L — SIGNIFICANT CHANGE UP
TSH SERPL-MCNC: 1.91 UIU/ML — SIGNIFICANT CHANGE UP (ref 0.27–4.2)
WBC # BLD: 5.94 K/UL — SIGNIFICANT CHANGE UP (ref 3.8–10.5)
WBC # FLD AUTO: 5.94 K/UL — SIGNIFICANT CHANGE UP (ref 3.8–10.5)

## 2023-07-09 PROCEDURE — 93010 ELECTROCARDIOGRAM REPORT: CPT

## 2023-07-09 PROCEDURE — 71046 X-RAY EXAM CHEST 2 VIEWS: CPT | Mod: 26

## 2023-07-09 PROCEDURE — 99285 EMERGENCY DEPT VISIT HI MDM: CPT

## 2023-07-09 NOTE — ED PROVIDER NOTE - ATTENDING CONTRIBUTION TO CARE
I performed a face-to-face evaluation of the patient and performed a history and physical examination. I agree with the history and physical examination. If this was a PA visit, I personally saw the patient with the PA and performed a substantive portion of the visit including all aspects of the medical decision making.    Has a commercially-available heart monitor b/c he has APCs. Is followed by Cards. This AM, his device told him he had a. fib. No Sx. He sent an image of his APCs to his Cards, who thought it was APCs. Will check CBC, CMP, TSH, trop, EKG, d-dimer. If unremarkable, then admit.

## 2023-07-09 NOTE — ED PROVIDER NOTE - PHYSICAL EXAMINATION
General: well appearing, alert, oriented to person, time, place  Psych: mood appropriate  Head: normocephalic; atraumatic  Eyes: conjunctivae clear bilaterally, sclerae anicteric  ENT: no nasal flaring, patent nares  Cardio: regularly irregular heart rate; faint systolic murmur auscultated at L lower sternal border  Resp: clear to auscultation bilaterally  GI: abdomen soft, nontender, nondistended  Neuro: strength 5/5 in upper and lower extremities; normal sensation  Skin: no rashes or bruising noted  MSK: normal movement of extremities  Lymph/Vasc: no LE edema; radial and DP pulses equal bilaterally

## 2023-07-09 NOTE — ED PROVIDER NOTE - PATIENT PORTAL LINK FT
You can access the FollowMyHealth Patient Portal offered by [facility] by registering at the following website: [email or web address]. By joining [facility]’s FollowMyHealth portal, you will also be able to view your health information using other applications (apps) compatible with our system.

## 2023-07-09 NOTE — ED PROVIDER NOTE - DISPOSITION TYPE
Make toilet training fun and stress free.    May change to 2% or skim milk.    Next visit is at 2-1/2 years.    Referred to [facility] for [facility].  [facility] is located at [address] [place] [address].  Their phone number is [phone].    [facility] Lab Hours    Mon-Thurs  7am - 6pm  Friday  7am - 5pm  Saturday 7am - 12pm    No appointment necessary.    Our office will call you with the results of your tests.         DISCHARGE

## 2023-07-09 NOTE — ED PROVIDER NOTE - NSFOLLOWUPINSTRUCTIONS_ED_ALL_ED_FT
You were seen here in the ED due to potential concerns for A-fib. It was apparently suggested by your commercially-available heart monitor that you had A-fib. You did not have any symptoms and your EKG showed PVC but not atrial fibrillation. Your blood work was also within normal limits. We check for a heart enzyme called troponin which was also normal in your case. If you start experiencing palpitations, chest pain that is very intense, lightheadedness, dizziness, shortness of breath, please come to the ED right away or see a cardiologist.

## 2023-07-09 NOTE — ED ADULT TRIAGE NOTE - CHIEF COMPLAINT QUOTE
Pt c/o chest palpations . Pt has a "[employer] mobile" monitor at home and was showing afib. PHX mitral valve regurgitation, aortic aneurysm. Pt sent to ekg awaiting for results.

## 2023-07-09 NOTE — ED ADULT NURSE NOTE - NS ED NURSE RECORD ANOTHER VITAL SIGN
[facility]  Critical Care Medicine  Progress Note    Patient Name: Adebayo Oneil  MRN: [id number]  Admission Date: 3/7/2020  Hospital Length of Stay: 2 days  Code Status: DNR  Attending Provider: Joao Cano MD  Primary Care Provider: Provider Notinsystem   Principal Problem: Acute upper GI bleed    Subjective:     HPI:  Mr Oneil is a elderly 72 yo WM with a PMH of AAA, ELDA, chronic constipation, HTN, GERD/Esophagitis, HLD, Seizure, Parkinon's disease and UC.  He is a resident at [facility] and DNR.  He was recently discharged on 3 days ago post admit for Upper GI bleed and ABL anemia undergoing EGD on 3/5 revealing non bleeding gastric ulcer with adherent clot.  He received 3 units PRBCs at that time and on day of discharge H/H 7.7/25 and home with PPI.  He returned to ED 3/7 via EMS about 1300 hr here at [facility] after being found unresponsive about 0930 hr yesterday morning.  Upon EMS arrival he was awake but lethargic and confused with mumbled speech and BP 75/43.  In ED BP 89/66, WBC 23, LA 3.7.  CT head unremarkable for acute process.  Also had CT chest/Abd/Pelvis revealing elevated right HD, RLL atelectasis and high grade impaction.  BP improved with IVFs and PRBCs and admitted with GI consult.  He received 2 units PRBCs yesterday afternoon.  Yesterday evening about 2145 hr had large clot hematemesis started on PPI and given another 2 units PRBCs.  This AM taken to Endo per GI undergoing EGD.  EGD this morning: The duodenal bulb and second portion of the duodenum were normal.       Two oozing cratered gastric ulcers with visible vessels in each        ulcer were found in the gastric fundus. The smaller ulcer was 20mm        in size and a adherent clot was appreciated. Clot removal with        forceps was attempted and successful. This ulcer was injected with        6cc of 1:10,000 solution of epinephrine and then treated with        thermal bipolar therapy. hemostasis was  achieved for the smaller        ulcer. The largest ulcer was 30 mm in largest dimension with two        large visible vessels. Area was unsuccessfully injected with 10 mL        of a 1:10,000 solution of epinephrine for hemostasis. Coagulation        for hemostasis using bipolar probe was unsuccessful. The ulcer's        vessel began to actively spurt with failed endoscopic attempts. The        stomach began to fill with bright red blood. Procedure was aborted        and anesthesia electively intubated the patient for airway control.        Two units of pRBCs were administered during this procedure.       No gross lesions were noted in the entire esophagus.  IR consulted and taken to cath lab revealing: Active bleed off short gastric artery off supperior trunk of the splenic artery which was embolized to stasis with coils and particles.  He received 4 more units PRBCs between Endo and IR per report as well as 1 Plt and FFP X 2.  Admitted from IR to ICU.       Hospital/ICU Course:  3/8 - Arrived to ICU supine and unresponsive intubated on mech ventilation still receiving FFP.  Right groin arterial sheath still in place and VSS on IVFs and Kaleb infusion.  He had OG replaced revealing blood and ABG w/ lytes revealed POCT Hct < 15.  3 more units PRBCs ordered emergently with Cryo and Kaleb changed to Levophed due to mild bradycardia.  Left femoral CL placed emergently and transducer connected to right femoral sheath for continuous arterial pressure monitoring.  Dr. Cano at bedside and case discussed in detail.    3/9- Supine in bed with no acute distress intubated. Patient placed on SBT this AM. Extubated this AM. Patient's H&H stable this AM with no plans to transfuse. Levophed infusion off last PM with stable hemodynamics.   3/10 - Supine in bed somnolent in no distress with VSS.  Tolerated extubation yesterday.  No active bleeding.  Off pressor for 36 hours.    Review of Systems   Unable to perform ROS: Mental status  change         Objective:     Vital Signs (Most Recent):  Temp: 98.5 °F (36.9 °C) (03/10/20 0305)  Pulse: 67 (03/10/20 0719)  Resp: 12 (03/10/20 0719)  BP: (!) 85/65 (03/10/20 0630)  SpO2: 100 % (03/10/20 0719) Vital Signs (24h Range):  Temp:  [97.2 °F (36.2 °C)-98.5 °F (36.9 °C)] 98.5 °F (36.9 °C)  Pulse:  [57-78] 67  Resp:  [8-18] 12  SpO2:  [95 %-100 %] 100 %  BP: ()/(38-98) 85/65     Weight: 64.9 kg (143 lb 1.3 oz)  Body mass index is 20.53 kg/m².      Intake/Output Summary (Last 24 hours) at 3/10/2020 0737  Last data filed at 3/10/2020 0600  Gross per 24 hour   Intake 3060 ml   Output 1555 ml   Net 1505 ml       Physical Exam   Constitutional: Vital signs are normal. He appears well-developed. He appears lethargic.  Non-toxic appearance. He has a sickly appearance. He appears ill. No distress. Nasal cannula in place.   HENT:   Head: Normocephalic and atraumatic.   Mouth/Throat: Oropharynx is clear and moist and mucous membranes are normal.   Eyes: Pupils are equal, round, and reactive to light. Lids are normal.   Neck: Trachea normal. Neck supple. No JVD present. Carotid bruit is not present.   Cardiovascular: Normal rate, regular rhythm and normal heart sounds. Exam reveals no gallop and no friction rub.   No murmur heard.  Pulses:       Radial pulses are 2+ on the right side, and 2+ on the left side.        Dorsalis pedis pulses are 1+ on the right side, and 1+ on the left side.   Pulmonary/Chest: Effort normal and breath sounds normal. No accessory muscle usage. No tachypnea. No respiratory distress.   Abdominal: Soft. Normal appearance. He exhibits no distension. Bowel sounds are decreased. There is no tenderness.   Genitourinary: Penis normal.   Genitourinary Comments: Adhikari in place   Musculoskeletal:        Right wrist: He exhibits decreased range of motion.        Left wrist: He exhibits decreased range of motion.        Right foot: There is no deformity.        Left foot: There is no deformity.    Trace tibial edema   Lymphadenopathy:     He has no cervical adenopathy.     He has no axillary adenopathy.   Neurological: He appears lethargic.   Withdraws to pain.  No purposeful movements.  Does not follow commands.     Skin: Skin is warm, dry and intact. Capillary refill takes less than 2 seconds. No rash noted. No cyanosis.            Vents:  Vent Mode: Spont (03/09/20 0741)  Ventilator Initiated: Yes (03/08/20 1037)  Set Rate: 0 BPM (03/09/20 0741)  Vt Set: 0 mL (03/09/20 0741)  Pressure Support: 8 cmH20 (03/09/20 0741)  PEEP/CPAP: 5 cmH20 (03/09/20 0741)  Oxygen Concentration (%): 32 (03/09/20 0750)  Peak Airway Pressure: 13 cmH2O (03/09/20 0741)  Plateau Pressure: 0 cmH20 (03/09/20 0741)  Total Ve: 11.2 mL (03/09/20 0741)  F/VT Ratio<105 (RSBI): (!) 12.62 (03/09/20 0741)    Lines/Drains/Airways     Central Venous Catheter Line            Percutaneous Central Line Insertion/Assessment - Triple Lumen  03/08/20 left femoral vein 2 days          Drain                 Urethral Catheter 03/08/20 2 days          Peripheral Intravenous Line                 Peripheral IV - Single Lumen 03/07/20 1350 20 G Right Forearm 2 days         Peripheral IV - Single Lumen 03/08/20 0918 18 G Left Wrist 1 day                Significant Labs:    CBC/Anemia Profile:  Recent Labs   Lab 03/08/20  1128  03/09/20  0401  03/09/20  1800 03/09/20  2334 03/10/20  0411   WBC 13.74*  --  10.08  --   --   --  8.38   HGB 7.3*   < > 9.7*  9.7*   < > 10.0* 9.3* 9.1*  9.1*   HCT 21.7*   < > 27.5*  27.5*   < > 29.1* 27.1* 27.4*  27.4*     --  138*  --   --   --  125*   MCV 91  --  92  --   --   --  95   RDW 16.4*  --  17.7*  --   --   --  18.9*    < > = values in this interval not displayed.        Chemistries:  Recent Labs   Lab 03/08/20  1258 03/09/20  0401 03/10/20  0411    146* 145   K 3.4* 3.2* 3.5   * 116* 119*   CO2 20* 21* 24   BUN 50* 53* 33*   CREATININE 1.0 0.8 0.8   CALCIUM 6.6* 6.9* 7.0*   ALBUMIN  --  1.8*  1.7*   PROT  --  3.9* 4.0*   BILITOT  --  0.4 0.4   ALKPHOS  --  49* 60   ALT  --  5* 12   AST  --  18 20   MG 1.8 1.8 2.0       All pertinent labs within the past 24 hours have been reviewed.        ABG  Recent Labs   Lab 03/09/20  0446   PH 7.419   PO2 163*   PCO2 35.0   HCO3 22.7*   BE -2     Assessment/Plan:     Neuro  Other seizures  Cont Keppra IVPB  No seizures noted  Cont neuro monitoring    Parkinson disease  Cont Sinemet if awakens and able to swallow    Acute encephalopathy  POA states patient sometimes has episodes where does not respond for 1-3 days  Possible anoxic etiology  Cont neuro monitoring  Cont supportive care  POA request no invasive support     Psychiatric  ELDA (generalized anxiety disorder)  Holding meds at this time d/t post extubation somnolence    Renal/  Hypokalemia  Replace K+ IV  Follow labs in AM    Oncology  Acute blood loss anemia  Patient has received 11 PRBCs this admission, FFP, Plts, and Cryo.  H/H stable and no further active bleeding  H&H Q 12 hours  Monitor closely for bleeding and follow coags.      GI  * Acute upper GI bleed of gastric artery  S/P coiling of gastric artery 3/8 per IR post failed attempt at Epi inj via EGD  H/H Q 12 hours  Monitor for bleeding  PPI BID  No transfusion indicated this AM  Family does not wish to pursue surgical intervention if re bleeding occurs.      Fecal impaction of colon  Mg Citrate per PO once/if more alert      Preventive Measures and Monitoring:   Stress Ulcer: PPI  Nutrition: NPO  Glucose control: stable  Bowel prophylaxis: Mg Citrate once safe for PO   DVT prophylaxis: SCDs  Hx CAD on B-Blocker: no hx CAD  Head of Bed/Reposition: Elevate HOB and turn Q1-2 hours   Early Mobility: bed rest  Central Line Left Femoral Day: #3  Adhikari Day: #3  Code Status: DNR  Pneumonia Vaccine: assume UTD at NH  Flu Vaccine: assume UTD at NH     Counseling/Consultation:I have discussed the care of this patient in detail with the bedside nursing staff  and Dr. Schmidt and Dr. Caon.  Conference yesterday with POA at bedside and all questions answered.  Will transfer to Tele and sign off.      Critical Care Time: 48 minutes  Critical secondary to Patient has a condition that poses threat to life and bodily function: Acute Encephalopathy     Critical care was time spent personally by me on the following activities: development of treatment plan with patient or surrogate and bedside caregivers, discussions with consultants, evaluation of patient's response to treatment, examination of patient, ordering and performing treatments and interventions, ordering and review of laboratory studies, ordering and review of radiographic studies, pulse oximetry, re-evaluation of patient's condition. This critical care time did not overlap with that of any other provider or involve time for any procedures.     Mickey Gonzalez NP  Critical Care Medicine  [facility]   Yes, record another set of vital signs

## 2023-07-09 NOTE — ED PROVIDER NOTE - CLINICAL SUMMARY MEDICAL DECISION MAKING FREE TEXT BOX
Laina: Has a commercially-available heart monitor b/c he has APCs. Is followed by Cards. This AM, his device told him he had a. fib. No Sx. He sent an image of his APCs to his Cards, who thought it was APCs. Will check CBC, CMP, TSH, trop, EKG, d-dimer. If unremarkable, then admit. Laina: Has a commercially-available heart monitor b/c he has APCs. Is followed by Cards. This AM, his device told him he had a. fib. No Sx. He sent an image of his APCs to his Cards, who thought it was APCs. CBC/CMP/EKG/Trop non impressive. Clear for discharge

## 2023-07-09 NOTE — ED PROVIDER NOTE - OBJECTIVE STATEMENT
Patient is an 82-year-old male with past medical history of mitral valve regurgitation, aortic aneurysm, and PACs on EKG  presenting to the emergency department after his "[employer]"  home heart monitor showed A-fib.  Patient states that he was concerned if he was having atrial fibrillation he should be on an anticoagulant, which he has not on at this time.   Patient was completely asymptomatic during this episode and is asymptomatic on evaluation.  Denies any chest pain, palpitations, shortness of breath, nausea, vomiting, abdominal pain, headaches, syncope, urinary symptoms, or any other pain/symptoms.

## 2023-07-09 NOTE — ED ADULT NURSE REASSESSMENT NOTE - NS ED NURSE REASSESS COMMENT FT1
Pt a&ox4, normal sinus rhythm on cardiac monitor. Denies CP, SOB, dyspnea, or pain at this time. Airway is patent, speaking in clear and coherent sentences. Respirations are even and unlabored, no signs of respiratory distress.

## 2023-07-09 NOTE — ED ADULT NURSE NOTE - CHIEF COMPLAINT QUOTE
Pt c/o chest palpations . Pt has a "[employer] mobile" monitor at home and was showing afib. PHX mitral valve regurgitation, aortic aneurysm. Pt sent to ekg awaiting for results.

## 2023-07-09 NOTE — ED ADULT NURSE NOTE - NSFALLUNIVINTERV_ED_ALL_ED
Bed/Stretcher in lowest position, wheels locked, appropriate side rails in place/Call bell, personal items and telephone in reach/Instruct patient to call for assistance before getting out of bed/chair/stretcher/Non-slip footwear applied when patient is off stretcher/[employer] to call system/Physically safe environment - no spills, clutter or unnecessary equipment/Purposeful proactive rounding/Room/bathroom lighting operational, light cord in reach

## 2023-07-09 NOTE — ED PROVIDER NOTE - PROGRESS NOTE DETAILS
Discussed results with patient. Labs and CXR all reassuring except for troponin in indeterminate range. 2nd troponin ordered. Patient continues to be asymptomatic.

## 2023-07-09 NOTE — ED ADULT NURSE NOTE - OBJECTIVE STATEMENT
Facilitator RN- Pt awake and alert, Phx APC presenting to ED for palpitations. Pt states he has "[employer]" mobile device at home and upon checking heart rhythm this morning device picked up pt being in AFib. Pt states he emailed results to Cardiologist. Pt states cardiologist told him he was not in Afib but since he was already at the hospital to stay and get evaluated, Pt denies cp, sob, dizziness. Pt placed on monitor, Irregular rhythm noted. 20g IV placed in RT AC, labs sent, call bell in reach. Area RN made aware.

## 2023-10-10 ENCOUNTER — APPOINTMENT (OUTPATIENT)
Dept: INTERNAL MEDICINE | Facility: CLINIC | Age: 83
End: 2023-10-10
Payer: MEDICARE

## 2023-10-10 VITALS
HEART RATE: 54 BPM | OXYGEN SATURATION: 99 % | BODY MASS INDEX: 24.77 KG/M2 | DIASTOLIC BLOOD PRESSURE: 67 MMHG | HEIGHT: 70 IN | SYSTOLIC BLOOD PRESSURE: 115 MMHG | WEIGHT: 173 LBS | TEMPERATURE: 97.6 F

## 2023-10-10 PROCEDURE — 99204 OFFICE O/P NEW MOD 45 MIN: CPT | Mod: 25

## 2023-10-10 PROCEDURE — 99214 OFFICE O/P EST MOD 30 MIN: CPT | Mod: 25

## 2023-10-10 PROCEDURE — 36415 COLL VENOUS BLD VENIPUNCTURE: CPT

## 2023-10-12 LAB
ANION GAP SERPL CALC-SCNC: 14 MMOL/L
APO B SERPL-MCNC: 65 MG/DL
BUN SERPL-MCNC: 14 MG/DL
CALCIUM SERPL-MCNC: 9.4 MG/DL
CHLORIDE SERPL-SCNC: 100 MMOL/L
CHOLEST SERPL-MCNC: 129 MG/DL
CO2 SERPL-SCNC: 25 MMOL/L
CREAT SERPL-MCNC: 0.77 MG/DL
EGFR: 89 ML/MIN/1.73M2
GLUCOSE SERPL-MCNC: 79 MG/DL
HDLC SERPL-MCNC: 49 MG/DL
LDLC SERPL CALC-MCNC: 65 MG/DL
NONHDLC SERPL-MCNC: 79 MG/DL
POTASSIUM SERPL-SCNC: 4.7 MMOL/L
SODIUM SERPL-SCNC: 138 MMOL/L
TRIGL SERPL-MCNC: 74 MG/DL

## 2023-10-16 ENCOUNTER — APPOINTMENT (OUTPATIENT)
Dept: HEART AND VASCULAR | Facility: CLINIC | Age: 83
End: 2023-10-16
Payer: MEDICARE

## 2023-10-16 ENCOUNTER — NON-APPOINTMENT (OUTPATIENT)
Age: 83
End: 2023-10-16

## 2023-10-16 VITALS
HEIGHT: 70 IN | BODY MASS INDEX: 24.34 KG/M2 | SYSTOLIC BLOOD PRESSURE: 110 MMHG | HEART RATE: 72 BPM | DIASTOLIC BLOOD PRESSURE: 54 MMHG | WEIGHT: 170 LBS | OXYGEN SATURATION: 98 %

## 2023-10-16 DIAGNOSIS — I65.29 OCCLUSION AND STENOSIS OF UNSPECIFIED CAROTID ARTERY: ICD-10-CM

## 2023-10-16 PROCEDURE — 99214 OFFICE O/P EST MOD 30 MIN: CPT | Mod: 25

## 2023-10-16 PROCEDURE — 93000 ELECTROCARDIOGRAM COMPLETE: CPT

## 2023-10-16 RX ORDER — MUPIROCIN 20 MG/G
2 OINTMENT TOPICAL
Qty: 1 | Refills: 2 | Status: DISCONTINUED | COMMUNITY
Start: 2022-09-22 | End: 2023-10-16

## 2023-10-16 RX ORDER — AMLODIPINE BESYLATE 5 MG/1
5 TABLET ORAL DAILY
Qty: 90 | Refills: 3 | Status: DISCONTINUED | COMMUNITY
Start: 2023-10-10 | End: 2023-10-16

## 2023-10-16 RX ORDER — TAMSULOSIN HYDROCHLORIDE 0.4 MG/1
0.4 CAPSULE ORAL
Qty: 30 | Refills: 4 | Status: DISCONTINUED | COMMUNITY
Start: 2023-10-10 | End: 2023-10-16

## 2023-10-16 RX ORDER — TELMISARTAN 80 MG/1
80 TABLET ORAL DAILY
Qty: 90 | Refills: 3 | Status: DISCONTINUED | COMMUNITY
Start: 2023-10-10 | End: 2023-10-16

## 2023-10-16 RX ORDER — AMLODIPINE BESYLATE 10 MG/1
10 TABLET ORAL DAILY
Refills: 0 | Status: ACTIVE | COMMUNITY

## 2023-10-16 RX ORDER — TADALAFIL 5 MG/1
5 TABLET ORAL
Qty: 90 | Refills: 3 | Status: DISCONTINUED | COMMUNITY
Start: 2023-10-10 | End: 2023-10-16

## 2023-10-16 RX ORDER — ROSUVASTATIN CALCIUM 5 MG/1
5 TABLET, FILM COATED ORAL DAILY
Qty: 90 | Refills: 2 | Status: DISCONTINUED | COMMUNITY
Start: 2023-10-10 | End: 2023-10-16

## 2023-10-16 RX ORDER — AMLODIPINE BESYLATE 5 MG/1
5 TABLET ORAL DAILY
Qty: 90 | Refills: 0 | Status: DISCONTINUED | COMMUNITY
Start: 2020-03-03 | End: 2023-10-16

## 2023-10-16 RX ORDER — VALACYCLOVIR 1 G/1
1 TABLET, FILM COATED ORAL
Qty: 24 | Refills: 2 | Status: DISCONTINUED | COMMUNITY
Start: 2020-01-15 | End: 2023-10-16

## 2023-10-18 ENCOUNTER — NON-APPOINTMENT (OUTPATIENT)
Age: 83
End: 2023-10-18

## 2023-11-13 RX ORDER — ROSUVASTATIN CALCIUM 10 MG/1
10 TABLET, FILM COATED ORAL DAILY
Qty: 90 | Refills: 3 | Status: ACTIVE | COMMUNITY
Start: 1900-01-01 | End: 1900-01-01

## 2023-12-13 NOTE — PLAN
[FreeTextEntry1] : \par \par WIll try flomax and monitor BP and Urinating at night\par \par PMR eval for left shoulder limited ROM.  \par \par f/up ENT for post ansal drip procedure\par \par f/up ophtho for watering eye- left\par \par Dem f/up for right finger finding.- needs annual anyways fair, will monitor progress closely

## 2023-12-15 ENCOUNTER — APPOINTMENT (OUTPATIENT)
Dept: HEART AND VASCULAR | Facility: CLINIC | Age: 83
End: 2023-12-15
Payer: MEDICARE

## 2023-12-15 VITALS
BODY MASS INDEX: 24.77 KG/M2 | OXYGEN SATURATION: 96 % | HEART RATE: 65 BPM | DIASTOLIC BLOOD PRESSURE: 74 MMHG | WEIGHT: 173 LBS | HEIGHT: 70 IN | SYSTOLIC BLOOD PRESSURE: 134 MMHG

## 2023-12-15 PROCEDURE — 93306 TTE W/DOPPLER COMPLETE: CPT

## 2023-12-15 PROCEDURE — 36415 COLL VENOUS BLD VENIPUNCTURE: CPT

## 2023-12-18 LAB
APO B SERPL-MCNC: 67 MG/DL
CHOLEST SERPL-MCNC: 126 MG/DL
HDLC SERPL-MCNC: 46 MG/DL
LDLC SERPL CALC-MCNC: 61 MG/DL
NONHDLC SERPL-MCNC: 80 MG/DL
NT-PROBNP SERPL-MCNC: 120 PG/ML
TRIGL SERPL-MCNC: 100 MG/DL

## 2023-12-27 ENCOUNTER — APPOINTMENT (OUTPATIENT)
Dept: HEART AND VASCULAR | Facility: CLINIC | Age: 83
End: 2023-12-27
Payer: MEDICARE

## 2023-12-27 DIAGNOSIS — R93.1 ABNORMAL FINDINGS ON DIAGNOSTIC IMAGING OF HEART AND CORONARY CIRCULATION: ICD-10-CM

## 2023-12-27 PROCEDURE — 99443: CPT

## 2023-12-28 ENCOUNTER — APPOINTMENT (OUTPATIENT)
Dept: HEART AND VASCULAR | Facility: CLINIC | Age: 83
End: 2023-12-28
Payer: MEDICARE

## 2024-01-17 ENCOUNTER — APPOINTMENT (OUTPATIENT)
Dept: HEART AND VASCULAR | Facility: CLINIC | Age: 84
End: 2024-01-17
Payer: MEDICARE

## 2024-01-17 DIAGNOSIS — E78.5 HYPERLIPIDEMIA, UNSPECIFIED: ICD-10-CM

## 2024-01-17 PROCEDURE — 99423 OL DIG E/M SVC 21+ MIN: CPT

## 2024-01-17 RX ORDER — EZETIMIBE 10 MG/1
10 TABLET ORAL DAILY
Qty: 90 | Refills: 3 | Status: ACTIVE | COMMUNITY
Start: 2024-01-17 | End: 1900-01-01

## 2024-01-17 RX ORDER — RIVAROXABAN 20 MG/1
20 TABLET, FILM COATED ORAL
Refills: 0 | Status: ACTIVE | COMMUNITY
Start: 2024-01-17

## 2024-01-17 NOTE — DISCUSSION/SUMMARY
[FreeTextEntry1] : 82 yo M w/ hx of nonobstructive CAD, pancreatitis, HTN, and moderate valvular regurgitation comes to the clinic to establish care for ASCVD risk optimization.  #HLD #Non-obstructive AD #ASCVD risk optimization   -Coronary CTA (2023): mild nonobstructive atherosclerosis is noted throughout the coronary arteries. No obstructive CAD, moderate burden of calcium score of 337, ascending aorta 4.1 cm at the level of sinus of valsalva   -lipid panel (10/2023): T cholesterol: 129; LDL; 65; HDL 49; TA  -ApoB (10/2023): 65  -lipoprotein A (2023): 50  -CRP (2023): 0.3  -Added zetia and will repeat labs in May.   #Valvular heart disease - mild-moderate mitral regurgitation Echo 12/15/2023- EF of 40-45%. Will repeat MRI in early May with follow-up appt in late May.   #Dilated ascending aorta Will assess again on echo and MRI without dedicated study since it has been consistently stable.   #Lifestyle - Encouraged patient to continue healthy exercise and eating habits, focusing on a Mediterranean style of eating and aiming for the recommended 150 minutes per week of moderate physical activity. - Dietary and exercise habits reviewed and discussed cardiovascular disease, the optimization of risk factors and lifestyle strategies that can be used to achieve this.  RTC - Return visit end of May.

## 2024-01-17 NOTE — HISTORY OF PRESENT ILLNESS
[FreeTextEntry1] : 82 yo M w/ hx of nonobstructive CAD, pancreatitis, HTN, and moderate valvular regurgitation comes to the clinic to establish care for ASCVD risk optimization. No chest pain, SOB, palpitations, nausea, vomiting, PND, or orthopnea reported.  He had an ablation with Dr. Palacios at [facility] as part of a trial. He is feeling well and in NSR to his knowledge. His smart watch is no longer noting PAC's either.  He wishes to wait post ablation to see if EF will improve. He was told by EP that the mild reduction may improve.  No symptoms on exertion at this time and no orthopnea.  He is interested in achieving even lower LDLs, but not to be on higher doses of statin based on his prior memory issues.  He reports that he has known since the  that his ascending aorta is dilated and assessed by Mauricio at [facility] with it staying 4.4 cm to 4.5 cm for 40 years. Last CT showed 4.4 cm at [facility] and echo reporting 4.7 likely off angle.   Previous cardiologist was Dr Sylvester Cartwright. Prior to the current Crestor patient was on Pravastatin (as the optimal LDL-C target was not reached, the medication was switched). He was previously tried on Atorvastatin 10 mg oral daily to which he developed brain fogginess and forgetfulness.   LIFESTYLE HISTORY:  Mediterranean Diet Score (9 question survey) was 6 (near optimal)  Self-described diet: eats a lot of fish, salads, limited meat intake, eats mostly home cooked food. Exercise: Patient reports exercising at a moderate level for 150 minutes per week.  Smoking: Never smoker  EtOH: Because of tinnitus (), patient does not drink alcohol.  Illicit drug use: No illicit drug use reported.  Stress: mild to moderate stress, patient works in stock market modeling, used to model for healthcare Sleep apnea: [ex. STOPBANG] occasionally snores at night.   Family Hx: Mom: Healthy  Dad: 50s-60s had a stroke No siblings or kids   =============================== RADIOLOGY/IMAGING/DIAGNOSTIC TESTING:  -Holter monitor (2022): HR , predominant rhythm was sinus rhythm, SVT runs occurred the run with the fastest interval lasting 7 beats with a max rate of 207 bpm, the longest lasting 20 beats with an average rate of 106 bpm. Isolated SVEs were occasional, SVE couplets were rare, and SVE triplets were rare. Isolated VEs rare, VE couplets were rare, and VE triplets were rare. Ventricular bigeminy and trigemini were present.   -ECHO (2022):  nl rv size and fx, mild tricuspid regurgitation, trace pulmonic regurgitation, mild to moderate mitral valve regurgitation, no pericardial effusion, EF 50-55%, aortic root (3.9 cm) and ascending aorta (4.7 cm) are dilated, mildly dilated left atrium  -EKG (10/16/2023):  Left anterior fascicular block   -Coronary CTA (2023): mild nonobstructive atherosclerosis is noted throughout the coronary arteries. No obstructive CAD, moderate burden of calcium score of 337, ascending aorta 4.1 cm at the level of sinus of valsalva   -Stress Test (22): Normal exercise tolerance, normal ECG response to treadmill exercise    -Carotid US (2023): mild atherosclerotic plaque noted in the bifurcation area bilaterally consistent with 0-49% stenosis  LABS:  -lipid panel (10/2023): T cholesterol: 129; LDL; 65; HDL 49; TA  -ApoB (10/2023): 65  -A1c (2022): 5.2  -lipoprotein A (2023): 50   -lipoprotein A (2022): 78  -ApoB (2023): 103  -CRP (2023): 0.3

## 2024-01-17 NOTE — REASON FOR VISIT
[CV Risk Factors and Non-Cardiac Disease] : CV risk factors and non-cardiac disease [Structural Heart and Valve Disease] : structural heart and valve disease [Coronary Artery Disease] : coronary artery disease [FreeTextEntry3] : Dr. Lele Henriquez

## 2024-01-24 ENCOUNTER — APPOINTMENT (OUTPATIENT)
Dept: ORTHOPEDIC SURGERY | Facility: CLINIC | Age: 84
End: 2024-01-24
Payer: MEDICARE

## 2024-01-24 DIAGNOSIS — M18.11 UNILATERAL PRIMARY OSTEOARTHRITIS OF FIRST CARPOMETACARPAL JOINT, RIGHT HAND: ICD-10-CM

## 2024-01-24 PROCEDURE — 99203 OFFICE O/P NEW LOW 30 MIN: CPT

## 2024-01-24 PROCEDURE — 73130 X-RAY EXAM OF HAND: CPT | Mod: RT

## 2024-02-13 PROBLEM — M25.551 RIGHT HIP PAIN: Status: ACTIVE | Noted: 2024-02-13

## 2024-02-15 ENCOUNTER — APPOINTMENT (OUTPATIENT)
Dept: ORTHOPEDIC SURGERY | Facility: CLINIC | Age: 84
End: 2024-02-15
Payer: MEDICARE

## 2024-02-15 VITALS
BODY MASS INDEX: 24.91 KG/M2 | SYSTOLIC BLOOD PRESSURE: 109 MMHG | HEIGHT: 70 IN | DIASTOLIC BLOOD PRESSURE: 68 MMHG | WEIGHT: 174 LBS | HEART RATE: 64 BPM

## 2024-02-15 DIAGNOSIS — M25.551 PAIN IN RIGHT HIP: ICD-10-CM

## 2024-02-15 PROCEDURE — 99213 OFFICE O/P EST LOW 20 MIN: CPT

## 2024-02-15 PROCEDURE — 73502 X-RAY EXAM HIP UNI 2-3 VIEWS: CPT

## 2024-02-15 PROCEDURE — 72100 X-RAY EXAM L-S SPINE 2/3 VWS: CPT

## 2024-02-18 NOTE — HISTORY OF PRESENT ILLNESS
[de-identified] : Johnny Wilkes is a 83 year old male who presented to the office for evaluation his right hip pain. Patient has been experiencing right hip pain sine last week. Pain is located over the lateral hip and leg. He has been experiencing difficulty walking. Patient is typically active and powerwalks. Patient had right hip pain a few years ago and was treated with PT. He has tried home exercises, which have helped with the pain. His pain has been improving. Pain is now significantly improved and has almost returned to normal. He only has pain with hip flexion and external rotation. Patient has a history of lower back pain, which is now doing well. No pain medications.  History: HTN, Atrial Palpitations s/p Ablation

## 2024-02-18 NOTE — DISCUSSION/SUMMARY
[de-identified] : Johnny Wilkes is a 83 year old male who presented to the office for evaluation of his right hip pain. Patient has been experiencing right hip pain for about 1 year. It has been improving. XRays showed right hip osteoarthritis. Examination showed good right hip range of motion. Discussed with the patient the examination and imaging findings.  Discussed with the patient the management of his hip osteoarthritis at this time, including physical therapy and anti-inflammatories.  Patient would like to continue home exercises. Patient will take over the counter anti-inflammatories or Tylenol as needed for pain control. Patient will follow-up in 2 months for reevaluation and management.  Patient understanding and in agreement the plan.  All questions answered.  Plan: -Home Exercises -Over the counter anti-inflammatories or Tylenol as needed for pain control -Follow up in 2 months for reevaluation and management

## 2024-02-18 NOTE — PHYSICAL EXAM
[de-identified] : Constitutional:  83 year old male, alert and oriented, cooperative, in no acute distress.  HEENT  NC/AT.  Appearance: symmetric  Neck/Back Straight without deformity or instability.  Good ROM.  Chest/Respiratory  Respiratory effort: no intercostal retractions or use of accessory muscles. Nonlabored Breathing  Mental Status:  Judgment, insight: intact Orientation: oriented to time, place, and person  Neurological: Sensory and Motor are grossly intact throughout  Right Hip Exam:  Tenderness:  	Sacroiliac: Negative  	Greater trochanter: Negative                   DAILY Test: Negative                  FADIR Test: Negative   Range of Motion:                 Extension - 0  	Flexion - 100 	IR - 30 	ER - 45  	Abd - 45  	Add - 30   Neurologic Exam     Motor intact including 5/5 Extensor Hallucis Longus, 5/5 Flexor Hallucis Longus, 5/5 Tibialis Anterior and 5/5 Gastrocnemius     Sensation Intact to Light Touch including Saphenous, Sural, Superficial Peroneal, Deep Peroneal, Tibial nerve distributions  Vascular Exam     Foot is warm and well perfused with 2+ Dorsalis Pedis Pulse  No pain with range of motion of the left hip or bilateral knees. No lumbar paraspinal muscle tenderness. [de-identified] : XRay: XRays of the Pelvis (1 View) and Right Hip (2 Views) taken in the office today and discussed with the patient. XRays demonstrate moderate joint space narrowing in the hip joint with subchondral sclerosis, overlying osteophytes, all consistent with osteoarthritis, Tonnis Grade: 2. (my personal interpretation)

## 2024-02-26 ENCOUNTER — APPOINTMENT (OUTPATIENT)
Dept: DERMATOLOGY | Facility: CLINIC | Age: 84
End: 2024-02-26
Payer: MEDICARE

## 2024-02-26 DIAGNOSIS — L23.9 ALLERGIC CONTACT DERMATITIS, UNSPECIFIED CAUSE: ICD-10-CM

## 2024-02-26 PROCEDURE — 99214 OFFICE O/P EST MOD 30 MIN: CPT

## 2024-02-26 RX ORDER — FEXOFENADINE HYDROCHLORIDE 180 MG/1
180 TABLET ORAL DAILY
Qty: 30 | Refills: 6 | Status: ACTIVE | COMMUNITY
Start: 2024-02-26 | End: 1900-01-01

## 2024-02-26 RX ORDER — HYDROCORTISONE 25 MG/G
2.5 CREAM TOPICAL
Qty: 1 | Refills: 1 | Status: ACTIVE | COMMUNITY
Start: 2024-02-26 | End: 1900-01-01

## 2024-02-26 NOTE — HISTORY OF PRESENT ILLNESS
[de-identified] : 83yoM last seen Jan 2023 by Dr. Cavazos, presenting today for evaluation of the above. Rash around b/l eyelids started ~2-3 mos ago.  This has happened intermittently for years.  A few weeks ago, his ophthalmologist (outside of [facility]) prescribed a steroid ointment which he has been applying nightly (Maxitrol Eye Ointment is a sterile eye ointment (dexamethasone, neomycin and polymyxin B).  Cold compresses and Fluorometholone eye lubrication drops (non-steroidal) throughout the day.  Hx of cataracts. Hx of environmental allergies.   On chronic gabapentin 600 mg TID for chronic pruritus. Prescribed by neruologist.   Skin cancer history:  - Basal cell carcinoma (BCC) of left side of nose, s/p Mohs surgery for sBCC on L nasal ala 01/03/2023 - He has a prior history of a BCC in a similar area about [id number]. He is not certain who treated the site, but he thinks it was [place]. 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

## 2024-02-26 NOTE — ASSESSMENT
[FreeTextEntry1] :  #Allergic contact dermatitis, uncertain trigger, eyelids - New diagnosis with uncertain prognosis.  - Education, counseling. - Stop Maxitrol ointment to affected areas.  - Start HCT 2.5% cream to affected areas bid PRN pruritus. Can use for up to 2 weeks at a time, but strongly encouraged use as needed given thin skin at eyelids. Discussed that we do not like to prescribe topical steroids for this area. Proper medication use and side effects discussed, including cutaneous atrophy and striae. Avoid long-term use; do not use on face, axillae, groin. Do not use as moisturizer. - For maintenance: Start Tacrolimus 0.1% ointment BID to affected area. SED.  - Reviewed medications for interactions to fexofenadine- Amlodipine, ezetimibe, gabapentin, rosuvastatin, tadalafil, tamsulosin, telmisartan, xarelto (temporary). Discussed that gabapentin has a moderate interaction with H1 antihistamines, may enhancing effect of CNS depressant. Patient verbalized understanding.  - Start daily non-sedating H1 anthistamine, allegra 24h 180 mg tablet qAM. SED.   RTC 4 weeks for f/u and FBSE. I discussed that Dr. Deshpande will be back from maternity leave end of March-early April.

## 2024-02-26 NOTE — PHYSICAL EXAM
[FreeTextEntry3] : Pink, hyperpigmented smooth patches bilateral upper and lower eyelids (R>>>L) No scale, no erythema, no edema. Non-tender to palpation.  Yellow exudate at L medial canthus.

## 2024-03-11 RX ORDER — TACROLIMUS 1 MG/G
0.1 OINTMENT TOPICAL TWICE DAILY
Qty: 1 | Refills: 6 | Status: ACTIVE | COMMUNITY
Start: 2024-02-26

## 2024-03-26 PROBLEM — N43.40 SPERMATOCELE: Status: ACTIVE | Noted: 2020-10-14

## 2024-03-26 PROBLEM — N52.9 ERECTILE DYSFUNCTION OF ORGANIC ORIGIN: Status: ACTIVE | Noted: 2019-04-15

## 2024-03-26 PROBLEM — R39.9 LOWER URINARY TRACT SYMPTOMS (LUTS): Status: ACTIVE | Noted: 2020-10-13

## 2024-03-26 PROBLEM — N43.3 BILATERAL HYDROCELE: Status: ACTIVE | Noted: 2020-10-14

## 2024-03-27 ENCOUNTER — APPOINTMENT (OUTPATIENT)
Dept: UROLOGY | Facility: CLINIC | Age: 84
End: 2024-03-27
Payer: MEDICARE

## 2024-03-27 DIAGNOSIS — R35.1 BENIGN PROSTATIC HYPERPLASIA WITH LOWER URINARY TRACT SYMPMS: ICD-10-CM

## 2024-03-27 DIAGNOSIS — Z86.03 PERSONAL HISTORY OF NEOPLASM OF UNCERTAIN BEHAVIOR: ICD-10-CM

## 2024-03-27 DIAGNOSIS — N40.1 BENIGN PROSTATIC HYPERPLASIA WITH LOWER URINARY TRACT SYMPMS: ICD-10-CM

## 2024-03-27 DIAGNOSIS — N52.9 MALE ERECTILE DYSFUNCTION, UNSPECIFIED: ICD-10-CM

## 2024-03-27 DIAGNOSIS — N43.3 HYDROCELE, UNSPECIFIED: ICD-10-CM

## 2024-03-27 DIAGNOSIS — N43.40 SPERMATOCELE OF EPIDIDYMIS, UNSPECIFIED: ICD-10-CM

## 2024-03-27 DIAGNOSIS — R39.9 UNSPECIFIED SYMPTOMS AND SIGNS INVOLVING THE GENITOURINARY SYSTEM: ICD-10-CM

## 2024-03-27 PROCEDURE — 51798 US URINE CAPACITY MEASURE: CPT

## 2024-03-27 PROCEDURE — 99215 OFFICE O/P EST HI 40 MIN: CPT

## 2024-03-27 RX ORDER — TADALAFIL 5 MG/1
5 TABLET ORAL
Qty: 90 | Refills: 3 | Status: ACTIVE | COMMUNITY
Start: 2022-01-20 | End: 1900-01-01

## 2024-03-27 RX ORDER — TAMSULOSIN HYDROCHLORIDE 0.4 MG/1
0.4 CAPSULE ORAL
Qty: 90 | Refills: 3 | Status: ACTIVE | COMMUNITY
Start: 2021-07-09 | End: 1900-01-01

## 2024-03-27 NOTE — PHYSICAL EXAM

## 2024-03-27 NOTE — HISTORY OF PRESENT ILLNESS
[FreeTextEntry1] : Mr. KENAN PIRES returns for his annual urologic follow-up.  He reports moderate stable chronic lower urinary tract symptoms (obstructive and irritative) with nocturia x 3. He is continuing on tamsulosin 0.4mg and Cialis 5mg daily. (In the past he experienced dizziness with alfuzosin--but not tamsulosin).  He had a Greenlight laser prostatectomy in 2001 with Dr. Skinner. IPSS: 20/2 Sono (performed to assess bladder emptying): 108cc PVR  Mr. Pires reports some degree of erectile dysfunction and does not wish to pursue treatment specifically for this as he does not have a partner.  He feels that the Cialis has allowed him to experience morning erections. He is experiencing difficulty ejaculating, which is age related and exacerbated by the alpha blocker.   PSA: 12/29/22--1.51; 5/19/17--1.45; 11/5/13--1.0

## 2024-03-27 NOTE — ASSESSMENT
[FreeTextEntry1] : I discussed the findings and options with . KENAN IPRES in detail.  He is satisfied with his urination and would like to simply continue on the pharmacologic management he is on.  He understands that we do not follow PSAs in octogenarians.  Providing that there are no new problems, I look forward to seeing Mr. Pires in 1 year (bladder sonogram).

## 2024-04-08 ENCOUNTER — APPOINTMENT (OUTPATIENT)
Dept: INTERNAL MEDICINE | Facility: CLINIC | Age: 84
End: 2024-04-08
Payer: MEDICARE

## 2024-04-08 VITALS
DIASTOLIC BLOOD PRESSURE: 56 MMHG | TEMPERATURE: 97.3 F | WEIGHT: 174 LBS | HEART RATE: 50 BPM | SYSTOLIC BLOOD PRESSURE: 126 MMHG | BODY MASS INDEX: 24.91 KG/M2 | HEIGHT: 70 IN | OXYGEN SATURATION: 97 %

## 2024-04-08 DIAGNOSIS — I48.0 PAROXYSMAL ATRIAL FIBRILLATION: ICD-10-CM

## 2024-04-08 DIAGNOSIS — I71.20 THORACIC AORTIC ANEURYSM, WITHOUT RUPTURE, UNSPECIFIED: ICD-10-CM

## 2024-04-08 PROCEDURE — 99213 OFFICE O/P EST LOW 20 MIN: CPT | Mod: 25

## 2024-04-08 PROCEDURE — 36415 COLL VENOUS BLD VENIPUNCTURE: CPT

## 2024-04-08 RX ORDER — COLCHICINE 0.6 MG/1
0.6 TABLET ORAL
Refills: 0 | Status: DISCONTINUED | COMMUNITY
Start: 2024-01-17 | End: 2024-04-08

## 2024-04-08 NOTE — ASSESSMENT
[FreeTextEntry1] : 83 yoM with a PMHx of HTN, HLD, arrhythmias (PACs, PVCs, pAfib), mod MR, pancreatic insufficiency, BPH, basal cell carcinoma (s/p Mohs x 2), nonobstructive CAD presents for general follow up.   # Arrythmias Pt w hx of frequent PACs, PVCs, pAfib, follows with Dr. Palacios at [facility] who has enrolled him in a clinical trial for pulse field ablation for his frequent arrhythmias. He underwent ablation on 1/2/24 and was in NSR for 2.5 months and has now converted back into Afib, so he may undergo a second ablation.  - f/u with Dr Palacios as scheduled - c/w xarelto 20mg po daily  #Non-obstructive CAD Follows w Dr Isaac last seen in January- TTE showed his EF decreased to 40-45% (from 55% previously) and showed reduction in size of his aortic aneurysm (both confusing findings as he has no sx of worsening HF and has not had any intervention on his aneurysm). - f/u as scheduled w Dr Isaac in May for repeat labs and cardiac MRI to evaluate EF/aortic aneurysm   # History of anemia Pt reports he has hx of anemia which caused more frequent arrhythmias in the past  - f/u CBC  RTC in Oct 2024 for CPE

## 2024-04-08 NOTE — PHYSICAL EXAM
[Normal Rate] : normal rate  [Normal S1, S2] : normal S1 and S2 [Normal] : affect was normal and insight and judgment were intact [de-identified] : irregularly irregular rhythm

## 2024-04-08 NOTE — END OF VISIT
[] : Resident [FreeTextEntry3] :  83 year old M presenting for follow up. Participating in clinical trial at [facility] with Dr. Isadora Palacios of pulse field ablation for Afib. Well appearing on exam. Had questions about pneumococcal vaccination, had PCV13 in 2014, PPSV 23 2020. Advised PCV20 in 2025. Discussed cardiac concerns, follow up cardiologists as scheduled. RTC 6 months, earlier prn.

## 2024-04-08 NOTE — HISTORY OF PRESENT ILLNESS
[FreeTextEntry1] : follow up [de-identified] : 83 yoM with a PMHx of HTN, HLD, arrhythmias (PACs, PVCs, pAfib), mod MR, pancreatic insufficiency, BPH, basal cell carcinoma (s/p Mohs x 2), nonobstructive CAD presents for general follow up. Pt would like to keep us updated on recent cardiac events - he follows with Dr. Palacios at [facility] who has enrolled him in a clinical trial for pulse field ablation for his frequent arrhythmias. He underwent ablation on 1/2/24 and was in NSR for 2.5 months and has now converted back into Afib, so he may undergo a second ablation. He saw Dr Isaac who did an echo which showed his EF 40-45% (from 55% previously) and showed reduction in size of his aortic aneurysm (both confusing findings as he has no sx of worsening HF and has not had any intervention on his aneurysm. He has a follow-up scheduled with in May for repeat labs and cardiac MRI.  Denies other acute complaints. Arrhythmias have always been asymptomatic.

## 2024-04-11 ENCOUNTER — NON-APPOINTMENT (OUTPATIENT)
Age: 84
End: 2024-04-11

## 2024-04-11 LAB
BASOPHILS # BLD AUTO: 0.04 K/UL
BASOPHILS NFR BLD AUTO: 0.6 %
EOSINOPHIL # BLD AUTO: 0.17 K/UL
EOSINOPHIL NFR BLD AUTO: 2.8 %
HCT VFR BLD CALC: 47.3 %
HGB BLD-MCNC: 15.4 G/DL
IMM GRANULOCYTES NFR BLD AUTO: 0.2 %
LYMPHOCYTES # BLD AUTO: 1.19 K/UL
LYMPHOCYTES NFR BLD AUTO: 19.3 %
MAN DIFF?: NORMAL
MCHC RBC-ENTMCNC: 32 PG
MCHC RBC-ENTMCNC: 32.6 GM/DL
MCV RBC AUTO: 98.3 FL
MONOCYTES # BLD AUTO: 0.5 K/UL
MONOCYTES NFR BLD AUTO: 8.1 %
NEUTROPHILS # BLD AUTO: 4.25 K/UL
NEUTROPHILS NFR BLD AUTO: 69 %
PLATELET # BLD AUTO: 138 K/UL
RBC # BLD: 4.81 M/UL
RBC # FLD: 13.3 %
WBC # FLD AUTO: 6.16 K/UL

## 2024-04-18 ENCOUNTER — APPOINTMENT (OUTPATIENT)
Dept: ORTHOPEDIC SURGERY | Facility: CLINIC | Age: 84
End: 2024-04-18

## 2024-04-30 ENCOUNTER — APPOINTMENT (OUTPATIENT)
Dept: HEART AND VASCULAR | Facility: CLINIC | Age: 84
End: 2024-04-30

## 2024-05-23 RX ORDER — GABAPENTIN 300 MG/1
300 CAPSULE ORAL
Qty: 540 | Refills: 0 | Status: ACTIVE | COMMUNITY
Start: 2023-06-02 | End: 1900-01-01

## 2024-06-03 ENCOUNTER — NON-APPOINTMENT (OUTPATIENT)
Age: 84
End: 2024-06-03

## 2024-06-03 ENCOUNTER — APPOINTMENT (OUTPATIENT)
Dept: HEART AND VASCULAR | Facility: CLINIC | Age: 84
End: 2024-06-03
Payer: MEDICARE

## 2024-06-03 PROCEDURE — 36415 COLL VENOUS BLD VENIPUNCTURE: CPT

## 2024-06-04 ENCOUNTER — TRANSCRIPTION ENCOUNTER (OUTPATIENT)
Age: 84
End: 2024-06-04

## 2024-06-04 ENCOUNTER — APPOINTMENT (OUTPATIENT)
Dept: MRI IMAGING | Facility: HOSPITAL | Age: 84
End: 2024-06-04
Payer: MEDICARE

## 2024-06-04 ENCOUNTER — OUTPATIENT (OUTPATIENT)
Dept: OUTPATIENT SERVICES | Facility: HOSPITAL | Age: 84
LOS: 1 days | End: 2024-06-04

## 2024-06-04 LAB
ALBUMIN SERPL ELPH-MCNC: 4.4 G/DL
ALP BLD-CCNC: 58 U/L
ALT SERPL-CCNC: 28 U/L
ANION GAP SERPL CALC-SCNC: 12 MMOL/L
APO B SERPL-MCNC: 62 MG/DL
AST SERPL-CCNC: 32 U/L
BILIRUB SERPL-MCNC: 0.6 MG/DL
BUN SERPL-MCNC: 18 MG/DL
CALCIUM SERPL-MCNC: 9.7 MG/DL
CHLORIDE SERPL-SCNC: 104 MMOL/L
CHOLEST SERPL-MCNC: 109 MG/DL
CO2 SERPL-SCNC: 24 MMOL/L
CREAT SERPL-MCNC: 1.02 MG/DL
CRP SERPL HS-MCNC: 0.52 MG/L
EGFR: 73 ML/MIN/1.73M2
ESTIMATED AVERAGE GLUCOSE: 111 MG/DL
GLUCOSE SERPL-MCNC: 100 MG/DL
HBA1C MFR BLD HPLC: 5.5 %
HDLC SERPL-MCNC: 41 MG/DL
LDLC SERPL CALC-MCNC: 53 MG/DL
NONHDLC SERPL-MCNC: 68 MG/DL
NT-PROBNP SERPL-MCNC: 182 PG/ML
POTASSIUM SERPL-SCNC: 4.7 MMOL/L
PROT SERPL-MCNC: 6.3 G/DL
SODIUM SERPL-SCNC: 140 MMOL/L
TRIGL SERPL-MCNC: 74 MG/DL

## 2024-06-04 PROCEDURE — A9577: CPT

## 2024-06-04 PROCEDURE — 75561 CARDIAC MRI FOR MORPH W/DYE: CPT | Mod: 26

## 2024-06-04 PROCEDURE — 75561 CARDIAC MRI FOR MORPH W/DYE: CPT

## 2024-06-07 ENCOUNTER — TRANSCRIPTION ENCOUNTER (OUTPATIENT)
Age: 84
End: 2024-06-07

## 2024-06-07 ENCOUNTER — NON-APPOINTMENT (OUTPATIENT)
Age: 84
End: 2024-06-07

## 2024-06-10 ENCOUNTER — APPOINTMENT (OUTPATIENT)
Dept: HEART AND VASCULAR | Facility: CLINIC | Age: 84
End: 2024-06-10
Payer: MEDICARE

## 2024-06-10 VITALS — DIASTOLIC BLOOD PRESSURE: 60 MMHG | SYSTOLIC BLOOD PRESSURE: 130 MMHG

## 2024-06-10 VITALS
TEMPERATURE: 97.6 F | OXYGEN SATURATION: 98 % | HEIGHT: 70 IN | SYSTOLIC BLOOD PRESSURE: 136 MMHG | HEART RATE: 54 BPM | DIASTOLIC BLOOD PRESSURE: 77 MMHG

## 2024-06-10 DIAGNOSIS — I34.0 NONRHEUMATIC MITRAL (VALVE) INSUFFICIENCY: ICD-10-CM

## 2024-06-10 DIAGNOSIS — I25.10 ATHEROSCLEROTIC HEART DISEASE OF NATIVE CORONARY ARTERY W/OUT ANGINA PECTORIS: ICD-10-CM

## 2024-06-10 PROCEDURE — 93000 ELECTROCARDIOGRAM COMPLETE: CPT

## 2024-06-10 PROCEDURE — G2211 COMPLEX E/M VISIT ADD ON: CPT

## 2024-06-10 PROCEDURE — 99215 OFFICE O/P EST HI 40 MIN: CPT

## 2024-06-10 NOTE — HISTORY OF PRESENT ILLNESS
[FreeTextEntry1] : 82 yo M w/ hx of SVEs s/p ablation, nonobstructive CAD, pancreatitis, HTN, and moderate valvular regurgitation comes follow up visit.   -Patient states that his palpitations have improved. -Power walks every day and eats largely a plant-based diet.   -He had an ablation with Dr. Palacios at [place] (2024) as part of a trial (pulse field ablation). -He has a cardia mobile - sinus rhythm with supraventricular ectopy (almost every time he uses it) -He reports that he has known since the  that his ascending aorta is dilated and assessed by Mauricio at [facility] with it staying 4.4 cm to 4.5 cm for 40 years. Last CT showed 4.4 cm at [facility] and echo reporting 4.7 likely off angle.  -No chest pain, SOB, nausea, vomiting, PND, or orthopnea reported.  Previous cardiologist was Dr Sylvester Cartwright. Prior to the current Crestor patient was on Pravastatin (as the optimal LDL-C target was not reached, the medication was switched). He was previously tried on Atorvastatin 10 mg oral daily to which he developed brain fogginess and forgetfulness.   LIFESTYLE HISTORY:  Mediterranean Diet Score (9 question survey) was 6 (near optimal)  Self-described diet: eats a lot of fish, salads, limited meat intake, eats mostly home cooked food. Exercise: Patient reports exercising at a moderate level for 150 minutes per week.  Smoking: Never smoker  EtOH: Because of tinnitus (), patient does not drink alcohol.  Illicit drug use: No illicit drug use reported.  Stress: mild to moderate stress, patient works in stock market modeling, used to model for healthcare Sleep apnea: [ex. STOPBANG] occasionally snores at night.   Family Hx: Mom: Healthy  Dad: 50s-60s had a stroke No siblings or kids   =============================== RADIOLOGY/IMAGING/DIAGNOSTIC TESTING: -MRI (2024): 1.  The left ventricle (LV) is dilated. Left ventricular global systolic function is reduced.  The LV ejection fraction is 47 %. 2.  The right ventricle (RV) is normal in size. RV global systolic function is normal. The RV ejection fraction is 50 %. 3.  No significant valvular abnormalities. 4.  No significant abnormalities of the visualized portions of the great vessels. 5.  Delayed images are limited quality.   On delayed enhancement imaging,  there is enhancement in a portion of the basal anterolateral/basal inferolateral wall segment which is likely near transmural in its extension.  Evaluation of atrial fibrosis is made challenging by image quality.  No obvious atrial fibrosis is appreciated. The sequences used in this study were designed for imaging cardiac structures and are suboptimal for imaging other structures and organs. - Echo (2023): 1. Mild to moderate left ventricular hypertrophy. 2. Left ventricular cavity is normal. Left ventricular systolic function is normal with an ejection fraction estimated at 40 to 45 %. The entire lateral wall and entire inferior wall appear hypokinetic in select views, but endocardial resolution is limited. 3. Normal left ventricular diastolic function. 4. Normal right ventricular cavity size, wall thickness, and systolic function. 5. Normal left and right atrial size. 6. Mild to moderate mitral regurgitation. 7. Mildly dilated ascending aorta. 8. No pericardial effusion seen. - MRI (2024): No significant abnormalities in the visualized portions of the thoracic aorta - Holter (6 days 12 hours, 2024): isolated SVEs were frequent 30.4%, SVE triplets were rare <1%, isolated VEs were rare   -Holter monitor (2022): HR , predominant rhythm was sinus rhythm, SVT runs occurred the run with the fastest interval lasting 7 beats with a max rate of 207 bpm, the longest lasting 20 beats with an average rate of 106 bpm. Isolated SVEs were occasional, SVE couplets were rare, and SVE triplets were rare. Isolated VEs rare, VE couplets were rare, and VE triplets were rare. Ventricular bigeminy and trigemini were present.   -ECHO (2022):  nl rv size and fx, mild tricuspid regurgitation, trace pulmonic regurgitation, mild to moderate mitral valve regurgitation, no pericardial effusion, EF 50-55%, aortic root (3.9 cm) and ascending aorta (4.7 cm) are dilated, mildly dilated left atrium  -EKG (10/16/2023):  Left anterior fascicular block   -Coronary CTA (2023): mild nonobstructive atherosclerosis is noted throughout the coronary arteries. No obstructive CAD, moderate burden of calcium score of 337, ascending aorta 4.1 cm at the level of sinus of valsalva   -Stress Test (22): Normal exercise tolerance, normal ECG response to treadmill exercise    -Carotid US (2023): mild atherosclerotic plaque noted in the bifurcation area bilaterally consistent with 0-49% stenosis  LABS: -lipid panel (2024): TAG 74, , HDL 41, LDL 53, ApoB 62, proBNP 182  -lipid panel (10/2023): T cholesterol: 129; LDL; 65; HDL 49; TA  -ApoB (10/2023): 65  -A1c (2022): 5.2  -lipoprotein A (2023): 50   -lipoprotein A (2022): 78  -ApoB (2023): 103  -CRP (2023): 0.3

## 2024-06-10 NOTE — DISCUSSION/SUMMARY
[FreeTextEntry1] : 82 yo M w/ hx of SVEs s/p ablation, nonobstructive CAD, pancreatitis, HTN, and moderate valvular regurgitation comes follow up visit.   #HLD #Non-obstructive AD #ASCVD risk optimization  #Secondary prevention  -Coronary CTA (7/26/2023): mild nonobstructive atherosclerosis is noted throughout the coronary arteries. No obstructive CAD, moderate burden of calcium score of 337, ascending aorta 4.1 cm at the level of sinus of Valsalva  -lipid panel (6/2024): TAG 74, , HDL 41, LDL 53, ApoB 62 -Lp(a) normal -c/w ezetimibe and rosuvastatin, LDL and apoB at goal -lifestyle as below   #Decreased EF #Valvular heart disease - mild-moderate mitral regurgitation - Echo 12/15/2023- EF of 40-45%.  - MRI (6/7/2024): No valvular abnormalities noted, EF 47% - moderately reduced EF could be secondary to ischemia  - given the EF in the late 40s will switch valsartan 80 mg to Entresto 24-26 mg BID  - patient states that he cannot tolerate beta blockers as he feels extremely fatigued (previously tried metoprolol) - could benefit from SGLT2 in the future  - follow up BMP in ~2 weeks   #Dilated ascending aorta - MRI (6/7/2024): No significant abnormalities in the visualized portions of the thoracic aorta  #SVE s/p ablation -He had an ablation with Dr. Palacios at [place] (1/2024) as part of a trial (pulse field ablation). - Holter (6 days 12 hours, 4/2024): isolated SVEs were frequent 30.4%, SVE triplets were rare <1%, isolated VEs were rare  - not unreasonable to consider another ablation given the increased SVE load (pt states that this is the highest it has ever been) - c/w Xarelto 20 mg oral daily    #Lifestyle - Encouraged patient to continue healthy exercise and eating habits, focusing on a Mediterranean style of eating and aiming for the recommended 150 minutes per week of moderate physical activity.  RTC: Follow up with NP at the end of July in person after BMP results for further medication optimization.  Follow up with Dr Isaac in 6 months.  [EKG obtained to assist in diagnosis and management of assessed problem(s)] : EKG obtained to assist in diagnosis and management of assessed problem(s)

## 2024-06-10 NOTE — PHYSICAL EXAM
[Well Developed] : well developed [Well Nourished] : well nourished [No Acute Distress] : no acute distress [Normal Conjunctiva] : normal conjunctiva [Normal Venous Pressure] : normal venous pressure [No Carotid Bruit] : no carotid bruit [Normal S1, S2] : normal S1, S2 [No Murmur] : no murmur [No Rub] : no rub [No Gallop] : no gallop [Clear Lung Fields] : clear lung fields [Good Air Entry] : good air entry [No Respiratory Distress] : no respiratory distress  [Soft] : abdomen soft [Non Tender] : non-tender [No Masses/organomegaly] : no masses/organomegaly [Normal Bowel Sounds] : normal bowel sounds [Normal Gait] : normal gait [No Edema] : no edema [No Rash] : no rash [Moves all extremities] : moves all extremities [Alert and Oriented] : alert and oriented [de-identified] : 3/6 systolic murmur

## 2024-06-14 RX ORDER — TELMISARTAN 80 MG/1
80 TABLET ORAL DAILY
Qty: 90 | Refills: 3 | Status: ACTIVE | COMMUNITY
Start: 2024-06-14

## 2024-06-14 RX ORDER — SACUBITRIL AND VALSARTAN 24; 26 MG/1; MG/1
24-26 TABLET, FILM COATED ORAL TWICE DAILY
Qty: 180 | Refills: 3 | Status: DISCONTINUED | COMMUNITY
Start: 2024-06-10 | End: 2024-06-14

## 2024-06-20 ENCOUNTER — APPOINTMENT (OUTPATIENT)
Dept: HEART FAILURE | Facility: CLINIC | Age: 84
End: 2024-06-20
Payer: MEDICARE

## 2024-06-20 VITALS
OXYGEN SATURATION: 95 % | HEART RATE: 52 BPM | SYSTOLIC BLOOD PRESSURE: 130 MMHG | HEIGHT: 70 IN | WEIGHT: 172 LBS | DIASTOLIC BLOOD PRESSURE: 80 MMHG | BODY MASS INDEX: 24.62 KG/M2

## 2024-06-20 DIAGNOSIS — I50.20 UNSPECIFIED SYSTOLIC (CONGESTIVE) HEART FAILURE: ICD-10-CM

## 2024-06-20 DIAGNOSIS — I42.8 OTHER CARDIOMYOPATHIES: ICD-10-CM

## 2024-06-20 DIAGNOSIS — I10 ESSENTIAL (PRIMARY) HYPERTENSION: ICD-10-CM

## 2024-06-20 DIAGNOSIS — I47.10 SUPRAVENTRICULAR TACHYCARDIA, UNSPECIFIED: ICD-10-CM

## 2024-06-20 PROCEDURE — 99215 OFFICE O/P EST HI 40 MIN: CPT

## 2024-06-21 PROBLEM — I50.20 HFREF (HEART FAILURE WITH REDUCED EJECTION FRACTION): Noted: 2023-12-27

## 2024-06-21 PROBLEM — I42.8 NONISCHEMIC CARDIOMYOPATHY: Status: ACTIVE | Noted: 2024-06-21

## 2024-06-21 RX ORDER — EMPAGLIFLOZIN 10 MG/1
10 TABLET, FILM COATED ORAL DAILY
Qty: 90 | Refills: 3 | Status: DISCONTINUED | COMMUNITY
Start: 2024-06-14 | End: 2024-06-21

## 2024-06-21 NOTE — CARDIOLOGY SUMMARY
[de-identified] : 6/10/2024 Holter: Predominate Rhythm SR (Avg HR 58 bpm, Min HR 40 bpm, Max  bpm). 56 runs of SVT (longest interval was 12.1 seconds, avg rate 100 bpm). Frequent Isolated SVE (30.4%). SVE Couplets (1%). SVE triplets (<1%). Isolated VE (<1%). VE Couplets (< 1%). No VE Triplets). No AF, No VT, No Pauses. No AVB (2nd Degree or 3rd Degree).    12/28/2022 Holter: Predominate Rhythm SR (Avg HR 55 bpm, Min HR 38 bpm, Max  bpm). 92 runs of SVT (longest 20 beats, avg rate 106 bpm).  Isolated SVE (4.5%). SVE Couplets (<1%). SVE triplets (<1%). Isolated VE (<1%). VE Couplets (< 1%).  Rare VE Triplets (<1%). Vent Bigeminy and Trigeminy present. No AF, No VT, No Pauses. No AVB (2nd Degree or 3rd Degree).  [de-identified] : 12/28/2022 Exercise Stress Test: Exercise 9 minutes (87% MPHR). Baseline /74, HR 60 bpm. Peak /70, Peak  bpm. ECG Changes: 0.5 - 1.0 mm upsloping ST depression. no arrhythmias apcs, vpcs. Conclusion: normal treadmill exercise.   [de-identified] : 12/28/2023 TTE: LVIDd 5.6 cm, LVH (SW 1.4 cm, PW 1.1 cm), LVEF 40-45% HK of lateral wall an entire inferior wall w/ limited resolution), RV normal size/function, LA/RA normal, mild to mod MR, PASP 17 mmHg, mildly dilated ascneding aorta, IVC normal, no pericardial effusion    12/28/2022 TTE: LVIDd 5.9 cm, LVH (SW 1.1 cm, PW 1.2 cm), LVEF 50-55%, RV normal size and function, mod LAE, RA normal,  mod MR, mild TR, PASP 30 mmHg, Aortic root and ascending aorta are dilated at 3.9 cm/ 4.7 cm, IVC normal, no pericardial effusion.   [de-identified] : 6/4/2024 cMRI: LV is dilated w/ LVEF 47%. RV is normal in size and function w/ RVEF 50%. No significant valvular abnormalities. On LGE - quality is limited but there is enhancement in a portion of the basal anterolateral/basal inferolateral wall segment which is likely near transmural in its extension.  7/26/2023: Coronary CTA (7/26/2023): mild nonobstructive atherosclerosis is noted throughout the coronary arteries. No obstructive CAD, moderate burden of calcium score of 337, ascending aorta 4.1 cm at the level of sinus of Valsalva

## 2024-06-21 NOTE — PHYSICAL EXAM
[No Xanthelasma] : no xanthelasma [Normal Venous Pressure] : normal venous pressure [Soft] : abdomen soft [Non Tender] : non-tender [Normal Bowel Sounds] : normal bowel sounds [Normal] : alert and oriented, normal memory [de-identified] : MMM, no perioral cyanosis [de-identified] : JVP < 6 cm H2O, no HJR [de-identified] : warm and dry

## 2024-06-21 NOTE — REASON FOR VISIT
[Cardiac Failure] : cardiac failure [FreeTextEntry3] : Mandi Isaac MD [FreeTextEntry1] : PATIENT INSTRUCTIONS:  1. Dr. Kellogg will discuss the risk/benefit of Jardiance with Dr. Isaac. There is no clinical benefit from a cardiomyopathy perspective. 2. You do NOT have heart failure, which is a clinical syndrome. You have Cardiomyopathy - meaning a weakness of the heart muscle. 3. Recommend echocardiograms for surveillance every 6 months to monitor your ejection fraction. 4. Return to see Dr. Kellogg if a clinical syndrome of heart failure develops or if your ejection fraction or proBNP level changes.

## 2024-06-21 NOTE — HISTORY OF PRESENT ILLNESS
[FreeTextEntry1] : This is a very pleasant, 84 YO M with hypertension, non-obstructive CAD (CCTA 7/23), and frequent PACs referred by Dr. Isaac for a recent change in LVEF from baseline 50% to 40-45% by echo. A more recent evaluation by cMRI revealed LVEF 47%. DCE was suggestive of a prior infarct. Other PMH includes a pulmonary vein [place] pAFib [place] [facility] [place] today for initial evaluation for cardiomyopathy.  Mr. Wilkes has a thorough understanding of his health history and notes his LVEF ~50% for "30 years." He has never had any clinical syndrome of heart failure and denies any prior hospitalizations for HF. Currently, he "power walks" every day and denies any PITTMAN even pushing a pace of 4 MPH. Cardiac mobile monitoring has revealed a 30% PAC burden, but he is unaware of the ectopy. He is considering another ablation as there is concern the PACs may trigger recurrent AF.   He denies CP, SOB at rest, orthopnea, PND, LH/dizziness, abdominal discomfort, LE edema, palpitations, and syncope. His appetite is normal, and his bowel and bladder habits are unchanged. He does not have an ICD or PPM. He has been limiting fluid and sodium in his diet and taking his medications as directed. He does not use NSAIDs. He has not been admitted to the hospital or seen in the ER for HF.

## 2024-06-21 NOTE — DISCUSSION/SUMMARY
[Patient] : the patient [FreeTextEntry1] : 84 YO M with hypertension, non-obstructive CAD (CCTA ), frequent PACs and cardiomyopathy, LVEF 47%, without a clinical syndrome of heart failure. He is ACC/AHA Stage B, NYHA Class I, and euvolemic on exam. have recommended the followin. Nonischemic cardiomyopathy without HF syndrome - He does not have any clinical syndrome of HF and has never been hospitalized for HF so there would not be a current indication for the use of Jardiance or ARNI. After discussion with Dr. Isaac, we agreed that stopping the Jardiance would be appropriate for him.  2. HTN - well controlled on current regiman. Continue telmisartan 80 mg daily, amlodipine 10 mg daily   3. PACs/pSVT - followed by Dr. Palacios at [facility] [place] with plan for PAC ablation. Maintained on Xarelto for AC post AF ablation.   4. Non-obstructive CAD - On crestor 10 mg daily and zetia 10 mg daily followed by Dr. Isaca.  5. Continue routine follow-up with Dr. Isaac and with Dr. Kellogg as needed.

## 2024-07-12 ENCOUNTER — TRANSCRIPTION ENCOUNTER (OUTPATIENT)
Age: 84
End: 2024-07-12

## 2024-07-12 RX ORDER — METOPROLOL SUCCINATE 25 MG/1
25 TABLET, EXTENDED RELEASE ORAL
Qty: 90 | Refills: 3 | Status: ACTIVE | COMMUNITY
Start: 2024-07-12 | End: 1900-01-01

## 2024-07-15 ENCOUNTER — RX RENEWAL (OUTPATIENT)
Age: 84
End: 2024-07-15

## 2024-07-22 ENCOUNTER — LABORATORY RESULT (OUTPATIENT)
Age: 84
End: 2024-07-22

## 2024-07-26 ENCOUNTER — APPOINTMENT (OUTPATIENT)
Dept: HEART AND VASCULAR | Facility: CLINIC | Age: 84
End: 2024-07-26
Payer: MEDICARE

## 2024-07-26 VITALS
TEMPERATURE: 98 F | HEIGHT: 70 IN | WEIGHT: 172 LBS | DIASTOLIC BLOOD PRESSURE: 64 MMHG | OXYGEN SATURATION: 95 % | SYSTOLIC BLOOD PRESSURE: 112 MMHG | HEART RATE: 55 BPM | BODY MASS INDEX: 24.62 KG/M2

## 2024-07-26 DIAGNOSIS — I25.10 ATHEROSCLEROTIC HEART DISEASE OF NATIVE CORONARY ARTERY W/OUT ANGINA PECTORIS: ICD-10-CM

## 2024-07-26 DIAGNOSIS — E78.5 HYPERLIPIDEMIA, UNSPECIFIED: ICD-10-CM

## 2024-07-26 DIAGNOSIS — I10 ESSENTIAL (PRIMARY) HYPERTENSION: ICD-10-CM

## 2024-07-26 DIAGNOSIS — I42.8 OTHER CARDIOMYOPATHIES: ICD-10-CM

## 2024-07-26 DIAGNOSIS — R00.2 PALPITATIONS: ICD-10-CM

## 2024-07-26 PROCEDURE — G2211 COMPLEX E/M VISIT ADD ON: CPT

## 2024-07-26 PROCEDURE — 36415 COLL VENOUS BLD VENIPUNCTURE: CPT

## 2024-07-26 PROCEDURE — 99214 OFFICE O/P EST MOD 30 MIN: CPT

## 2024-07-26 NOTE — ED ADULT NURSE NOTE - NS ED PATIENT SAFETY CONCERN
No

## 2024-07-26 NOTE — DISCUSSION/SUMMARY
[FreeTextEntry1] : 85 yo M w/ hx of SVEs s/p ablation, nonobstructive CAD, pancreatitis, HTN, and moderate valvular regurgitation comes follow up visit.   Nonischemic cardiomyopathy without HF syndrome - He does not have any clinical syndrome of HF and has never been hospitalized for HF so there would not be a current indication for the use of Jardiance or ARNI. After discussion with Dr. Isaac, we agreed that stopping the Jardiance would be appropriate for him.  HTN - well controlled on current regimen - Continue telmisartan 80 mg daily, amlodipine 10 mg daily - labs this month all WNL  - advised we will keep an eye on it; now w/ Toprol and his increased exercise, may need to decrease some meds   PACs/pSVT - followed by Dr. Palacios at [facility] [place] with plan for PAC ablation. Maintained on Xarelto for AC post AF ablation - will be putting on new Zio patch next week; has appt in about a month w/ EP; he will keep us posted regarding the results and his plan for treatment   #HLD #Non-obstructive CAD -Lp(a) normal -Coronary CTA (7/26/2023): mild nonobstructive atherosclerosis is noted throughout the coronary arteries. No obstructive CAD, moderate burden of calcium score of 337, ascending aorta 4.1 cm at the level of sinus of Valsalva  -lipid panel (6/2024): TAG 74, , HDL 41, LDL 53, ApoB 62 -c/w ezetimibe and rosuvastatin, LDL and apoB at goal -lifestyle as below  - lab checked regular CRP instead of cardiac/hs; will reorder today   Dilated ascending aorta - MRI (6/7/2024): No significant abnormalities in the visualized portions of the thoracic aorta; will continue to monitor  #Lifestyle - Encouraged patient to continue healthy exercise and eating habits, focusing on a Mediterranean style of eating and aiming for the recommended 150 minutes per week of moderate physical activity.  Follow up with Dr Isaac in 6 months.

## 2024-07-26 NOTE — PHYSICAL EXAM
[Well Developed] : well developed [Well Nourished] : well nourished [No Acute Distress] : no acute distress [Normal Conjunctiva] : normal conjunctiva [No Carotid Bruit] : no carotid bruit [Normal S1, S2] : normal S1, S2 [No Murmur] : no murmur [No Rub] : no rub [No Gallop] : no gallop [Clear Lung Fields] : clear lung fields [Good Air Entry] : good air entry [No Respiratory Distress] : no respiratory distress  [Normal Gait] : normal gait [No Edema] : no edema [No Rash] : no rash [Moves all extremities] : moves all extremities [Alert and Oriented] : alert and oriented [No Focal Deficits] : no focal deficits [Normal Speech] : normal speech [Normal memory] : normal memory [de-identified] : 3/6 systolic murmur

## 2024-07-26 NOTE — HISTORY OF PRESENT ILLNESS
[FreeTextEntry1] : 83 yo M w/ hx of SVEs s/p ablation, nonobstructive CAD, pancreatitis, HTN, and moderate valvular regurgitation comes follow up visit.   Dr. Isaac spoke with patient on . He will try toprol 25 mg daily (advised he could cut in half for split dosing if needed), stay hydrated, being monitored by EP with event monitor and would benefit from suppression of PVC's. (30% burden on monitor).   Saw HF and was advised of the followin. Dr. Kellogg will discuss the risk/benefit of Jardiance with Dr. Isaac. There is no clinical benefit from a cardiomyopathy perspective. 2. You do NOT have heart failure, which is a clinical syndrome. You have Cardiomyopathy - meaning a weakness of the heart muscle. 3. Recommend echocardiograms for surveillance every 6 months to monitor your ejection fraction. 4. Return to see Dr. Kellogg if a clinical syndrome of heart failure develops or if your ejection fraction or proBNP level changes.  He reports feeling very well today. He will be getting a new Zio patch from Dr. Palacios. Considering another ablation. Pt is tolerating the Toprol (still doing split dosing) and he has been monitoring w/ his [employer] Mobile. Was NSR last night and HR was 52. He has been noting his HR in the 50s.   He has been walking regularly and doing brisk walking and feels it is helping him a lot.   =============================Cardiac testin/10/2024 Holter: Predominate Rhythm SR (Avg HR 58 bpm, Min HR 40 bpm, Max  bpm). 56 runs of SVT (longest interval was 12.1 seconds, avg rate 100 bpm). Frequent Isolated SVE (30.4%). SVE Couplets (1%). SVE triplets (<1%). Isolated VE (<1%). VE Couplets (< 1%). No VE Triplets). No AF, No VT, No Pauses. No AVB (2nd Degree or 3rd Degree).  2022 Holter: Predominate Rhythm SR (Avg HR 55 bpm, Min HR 38 bpm, Max  bpm). 92 runs of SVT (longest 20 beats, avg rate 106 bpm). Isolated SVE (4.5%). SVE Couplets (<1%). SVE triplets (<1%). Isolated VE (<1%). VE Couplets (< 1%). Rare VE Triplets (<1%). Vent Bigeminy and Trigeminy present. No AF, No VT, No Pauses. No AVB (2nd Degree or 3rd Degree).   Stress Test: 2022 Exercise Stress Test: Exercise 9 minutes (87% MPHR). Baseline /74, HR 60 bpm. Peak /70, Peak  bpm. ECG Changes: 0.5 - 1.0 mm upsloping ST depression. no arrhythmias apcs, vpcs. Conclusion: normal treadmill exercise.   Echo: 2023 TTE: LVIDd 5.6 cm, LVH (SW 1.4 cm, PW 1.1 cm), LVEF 40-45% HK of lateral wall an entire inferior wall w/ limited resolution), RV normal size/function, LA/RA normal, mild to mod MR, PASP 17 mmHg, mildly dilated ascneding aorta, IVC normal, no pericardial effusion  2022 TTE: LVIDd 5.9 cm, LVH (SW 1.1 cm, PW 1.2 cm), LVEF 50-55%, RV normal size and function, mod LAE, RA normal, mod MR, mild TR, PASP 30 mmHg, Aortic root and ascending aorta are dilated at 3.9 cm/ 4.7 cm, IVC normal, no pericardial effusion.   CT/MRI: 2024 cMRI: LV is dilated w/ LVEF 47%. RV is normal in size and function w/ RVEF 50%. No significant valvular abnormalities. On LGE - quality is limited but there is enhancement in a portion of the basal anterolateral/basal inferolateral wall segment which is likely near transmural in its extension.  2023: Coronary CTA (2023): mild nonobstructive atherosclerosis is noted throughout the coronary arteries. No obstructive CAD, moderate burden of calcium score of 337, ascending aorta 4.1 cm at the level of sinus of Valsalva  ============================================================= -Patient states that his palpitations have improved. -Power walks every day and eats largely a plant-based diet.   -He had an ablation with Dr. Palacios at [place] (2024) as part of a trial (pulse field ablation). -He has a cardia mobile - sinus rhythm with supraventricular ectopy (almost every time he uses it) -He reports that he has known since the  that his ascending aorta is dilated and assessed by Mauricio at [place] with it staying 4.4 cm to 4.5 cm for 40 years. Last CT showed 4.4 cm at [facility] and echo reporting 4.7 likely off angle.  -No chest pain, SOB, nausea, vomiting, PND, or orthopnea reported.  Previous cardiologist was Dr Sylvester Cartwright. Prior to the current Crestor patient was on Pravastatin (as the optimal LDL-C target was not reached, the medication was switched). He was previously tried on Atorvastatin 10 mg oral daily to which he developed brain fogginess and forgetfulness.   LIFESTYLE HISTORY:  Mediterranean Diet Score (9 question survey) was 6 (near optimal)  Self-described diet: eats a lot of fish, salads, limited meat intake, eats mostly home cooked food. Exercise: Patient reports exercising at a moderate level for 150 minutes per week.  Smoking: Never smoker  EtOH: Because of tinnitus (), patient does not drink alcohol.  Illicit drug use: No illicit drug use reported.  Stress: mild to moderate stress, patient works in stock market modeling, used to model for healthcare Sleep apnea: [ex. STOPBANG] occasionally snores at night.   Family Hx: Mom: Healthy  Dad: 50s-60s had a stroke No siblings or kids   =============================== RADIOLOGY/IMAGING/DIAGNOSTIC TESTING: -MRI (2024): 1.  The left ventricle (LV) is dilated. Left ventricular global systolic function is reduced.  The LV ejection fraction is 47 %. 2.  The right ventricle (RV) is normal in size. RV global systolic function is normal. The RV ejection fraction is 50 %. 3.  No significant valvular abnormalities. 4.  No significant abnormalities of the visualized portions of the great vessels. 5.  Delayed images are limited quality.   On delayed enhancement imaging,  there is enhancement in a portion of the basal anterolateral/basal inferolateral wall segment which is likely near transmural in its extension.  Evaluation of atrial fibrosis is made challenging by image quality.  No obvious atrial fibrosis is appreciated. The sequences used in this study were designed for imaging cardiac structures and are suboptimal for imaging other structures and organs. - Echo (2023): 1. Mild to moderate left ventricular hypertrophy. 2. Left ventricular cavity is normal. Left ventricular systolic function is normal with an ejection fraction estimated at 40 to 45 %. The entire lateral wall and entire inferior wall appear hypokinetic in select views, but endocardial resolution is limited. 3. Normal left ventricular diastolic function. 4. Normal right ventricular cavity size, wall thickness, and systolic function. 5. Normal left and right atrial size. 6. Mild to moderate mitral regurgitation. 7. Mildly dilated ascending aorta. 8. No pericardial effusion seen. - MRI (2024): No significant abnormalities in the visualized portions of the thoracic aorta - Holter (6 days 12 hours, 2024): isolated SVEs were frequent 30.4%, SVE triplets were rare <1%, isolated VEs were rare   -Holter monitor (2022): HR , predominant rhythm was sinus rhythm, SVT runs occurred the run with the fastest interval lasting 7 beats with a max rate of 207 bpm, the longest lasting 20 beats with an average rate of 106 bpm. Isolated SVEs were occasional, SVE couplets were rare, and SVE triplets were rare. Isolated VEs rare, VE couplets were rare, and VE triplets were rare. Ventricular bigeminy and trigemini were present.   -ECHO (2022):  nl rv size and fx, mild tricuspid regurgitation, trace pulmonic regurgitation, mild to moderate mitral valve regurgitation, no pericardial effusion, EF 50-55%, aortic root (3.9 cm) and ascending aorta (4.7 cm) are dilated, mildly dilated left atrium  -EKG (10/16/2023):  Left anterior fascicular block   -Coronary CTA (2023): mild nonobstructive atherosclerosis is noted throughout the coronary arteries. No obstructive CAD, moderate burden of calcium score of 337, ascending aorta 4.1 cm at the level of sinus of valsalva   -Stress Test (22): Normal exercise tolerance, normal ECG response to treadmill exercise    -Carotid US (2023): mild atherosclerotic plaque noted in the bifurcation area bilaterally consistent with 0-49% stenosis  LABS: -lipid panel (2024): TAG 74, , HDL 41, LDL 53, ApoB 62, proBNP 182  -lipid panel (10/2023): T cholesterol: 129; LDL; 65; HDL 49; TA  -ApoB (10/2023): 65  -A1c (2022): 5.2  -lipoprotein A (2023): 50   -lipoprotein A (2022): 78  -ApoB (2023): 103  -CRP (2023): 0.3

## 2024-07-29 ENCOUNTER — TRANSCRIPTION ENCOUNTER (OUTPATIENT)
Age: 84
End: 2024-07-29

## 2024-07-29 LAB — CRP SERPL HS-MCNC: 0.7 MG/L

## 2024-08-13 ENCOUNTER — APPOINTMENT (OUTPATIENT)
Dept: INTERNAL MEDICINE | Facility: CLINIC | Age: 84
End: 2024-08-13
Payer: MEDICARE

## 2024-08-13 VITALS
HEART RATE: 74 BPM | TEMPERATURE: 98.2 F | HEIGHT: 70 IN | DIASTOLIC BLOOD PRESSURE: 77 MMHG | OXYGEN SATURATION: 96 % | SYSTOLIC BLOOD PRESSURE: 138 MMHG | WEIGHT: 172 LBS | BODY MASS INDEX: 24.62 KG/M2

## 2024-08-13 DIAGNOSIS — D69.6 THROMBOCYTOPENIA, UNSPECIFIED: ICD-10-CM

## 2024-08-13 DIAGNOSIS — I10 ESSENTIAL (PRIMARY) HYPERTENSION: ICD-10-CM

## 2024-08-13 DIAGNOSIS — I47.10 SUPRAVENTRICULAR TACHYCARDIA, UNSPECIFIED: ICD-10-CM

## 2024-08-13 PROCEDURE — 36415 COLL VENOUS BLD VENIPUNCTURE: CPT

## 2024-08-13 PROCEDURE — 99214 OFFICE O/P EST MOD 30 MIN: CPT | Mod: 25,GC

## 2024-08-13 NOTE — PHYSICAL EXAM
[Normal S1, S2] : normal S1 and S2 [No Murmur] : no murmur heard [No Focal Deficits] : no focal deficits [Normal] : affect was normal and insight and judgment were intact

## 2024-08-13 NOTE — REASON FOR VISIT
[Annual Wellness Visit] : an annual wellness visit [FreeTextEntry1] : Pt states he is here for his yearly physical.

## 2024-08-14 NOTE — HISTORY OF PRESENT ILLNESS
[FreeTextEntry1] : Presenting for physical exam [de-identified] : Patient is an 82 yo M w PMHx HTN, HLD, arrythmias (PACs), mod. MVR, pancreatic insufficiency, BPH, HFrEF (EF 40-45%), basal cell carcinoma (s/p x2 Mohs surgery) presenting to the clinic for follow up.   Cardiac history - He states that he has history of PAC's. Follows with EP at [place] who performed ablation earlier this year but PAC's have returned. Scheduled for another ablation with them soon. Remains on metoprolol and Xarelto. Also follows closely with Dr. Iasac for management of his hyperlipidemia. Is on rosuvastatin 10 mg and zetia 10 mg with good control. Also follows with Dr. Gonzalez's with regards to his HFrEF, most recent echo evidencing EF of 40-45%. Has history of mitral regurgitation which patient believes has been causing his PAC's. Also hisotry of ascending aortic aneurysm last measured via CT to be 4.1 and via last echo at 4.0 cm.   HTN- He checks at home regularly stating that his pressures are within 110's and 120's systolically. He takes his medications daily which are amlodipine 10 mg qd and telmisartan 80 m gqd. Also is on metoprolol 12.5 mg qd for his PAC's. Denies any complaints of chest pain, headaches, fatigue, dizziness, hematuria, visual complaints.   GI- He is following a GI physician associated with [facility] for his pancreatic insufficiency. is prescribed Creon which he continues to take daily. Had colonoscopy in 2016 which was normal and recently but was not complete colonoscopy given poor preparation. Was provided Cologuard test instead of undergoing another colonoscopy, especially given his age, which patient states was normal. Is due for Cologuard test at the end of this year that his GI physician provided him with a referral for.   BPH - Takes tadalafil 5 mg qd and tamsulosin .4 mg qd daily. Denies frequent urination, inability to completely empty bladder, slow stream, getting up frequently at night.   BCC - Had prior Moh's surgery twice. Will be following up with Dermatology again soon.

## 2024-08-14 NOTE — END OF VISIT
[] : Resident [FreeTextEntry3] : 84-year-old male presenting for follow-up.  He has questions about pneumococcal vaccination.  Reports that he had a PCV 20 about 10 years ago in 2014, had PPSV23 in 2020, wonders if he needs additional pneumococcal vaccination.  He follows with Dr. Isaac in lipid clinic, he also follows with an electrophysiologist at [place].  No acute complaints.  Well-appearing on exam.  Will repeat CBC today given mild thrombocytopenia on prior labs.  Will check urine albumin given history of hypertension.  Will plan to administer PCV 20 next year in 2025 as that will be 5 years after his last pneumococcal vaccine, his pneumococcal vaccine sedation will be considered complete at that time.  Return to clinic in October or November for CPE, earlier as needed.

## 2024-08-14 NOTE — ASSESSMENT
[FreeTextEntry1] : Patient is an 84 yo M w PMHx HTN, HLD, arrythmias (PACs), mod. MVR, pancreatic insufficiency, BPH, HFrEF (EF 40-45%), basal cell carcinoma (s/p x2 Mohs surgery) presenting to the clinic for follow up.   #PAC's/pSVT History of PAC's. Follows with EP at [place] who performed ablation earlier this year but PAC's have returned. Scheduled for another ablation with them soon. Remains on metoprolol and Xarelto.  - C/w Metoprolol 12.5 mg qd (splits 25 mg pill in half) and Xarelto 20 mg qd  #HFrEF EF of 40-45% based off recent echo. Follows with Dr. Gonzalez's. Asymptomatic.  - On metoprolol 12.5 mg qd for PAC's/pSVT and telmisartan for BP  - Continue to follow up with Dr. Gonzalez's as needed  #HLD Follows closely with Dr. Isaac for management of his hyperlipidemia. Is on rosuvastatin 10 mg and Zetia 10 mg with good control.  Triglycerides 55, total cholesterol 105, HDL 45, LDL 46.  - C/w rosuvastatin 10 mg qd and Zetia 10 mg qd - Continue to follow up with Dr. Isaac  #Mitral Regurgitation Has history of mitral regurgitation. Denies current sob, orthopnea.  - Continue to follow up with cardiology   #Ascending Aortic Aneurysm Last measured via CT to be 4.1 and via last echo at 4.0 cm. Prior CT evidencing 4.6 cm. - Continue to monitor  #HTN Checks at home regularly w/ pressures within 110's and 120's systolically. Takes his medications daily which are amlodipine 10 mg qd and telmisartan 80 mg qd. Also is on metoprolol 12.5 qd for his PAC's. Denies any complaints of chest pain, headaches, fatigue, dizziness, hematuria, visual complaints.  - C/w Telmisartan 80 mg qd, amlodipine 10 mg qd and metoprolol 12.5 mg qd   #Pancreatic Insufficiency Following a GI physician associated with [facility] for his pancreatic insufficiency. is prescribed Creon which he continues to take daily.  - C/w Creon - C/w GI follow up as needed  #BPH  Takes tadalafil 5 mg qd and tamsulosin .4mg  qd daily. Denies frequent urination, inability to completely empty bladder, slow stream, getting up frequently at night.  - C/w tadalafil 5 mg qd and tamsulosin .4 mg qd  #Hx of BCC  Had prior Moh's surgery twice. Will be following up with Dermatology again soon.  #Thrombocytopenia Patient with abnormal platelet value of 138 earlier this year in April. No overt signs of bleeding. Does admit to gums easily bleeding when brushing teeth.  - F/u CBC  - If platelets remain low, will w/u at next visit   #HCM Screening: Colonoscopy in 2016 normal per patient, normal Cologuard test 1-2 years ago. Scheduled to receive Cologuard test by GI provider later this year.   RTC in 3 months for Annual Wellness Physical

## 2024-08-14 NOTE — END OF VISIT
[] : Resident [FreeTextEntry3] : 84-year-old male presenting for follow-up.  He has questions about pneumococcal vaccination.  Reports that he had a PCV 20 about 10 years ago in 2014, had PPSV23 in 2020, wonders if he needs additional pneumococcal vaccination.  He follows with Dr. Isaac in lipid clinic, he also follows with an electrophysiologist at [place].  No acute complaints.  Well-appearing on exam.  Will repeat CBC today given mild thrombocytopenia on prior labs.  Will check urine albumin given history of hypertension.  Will plan to administer PCV 20 next year in 2025 as that will be 5 years after his last pneumococcal vaccine, his pneumococcal vaccine sedation will be considered complete at that time.  Return to clinic in October or November for CPE, earlier as needed.

## 2024-08-14 NOTE — END OF VISIT
[] : Resident [FreeTextEntry3] : 84-year-old male presenting for follow-up.  He has questions about pneumococcal vaccination.  Reports that he had a PCV 20 about 10 years ago in 2014, had PPSV23 in 2020, wonders if he needs additional pneumococcal vaccination.  He follows with Dr. Isaac in lipid clinic, he also follows with an electrophysiologist at [place].  No acute complaints.  Well-appearing on exam.  Will repeat CBC today given mild thrombocytopenia on prior labs.  Will check urine albumin given history of hypertension.  Will plan to administer PCV 20 next year in 2025 as that will be 5 years after his last pneumococcal vaccine, his pneumococcal vaccine sedation will be considered complete at that time.  Return to clinic in October or November for CPE, earlier as needed.

## 2024-08-14 NOTE — HISTORY OF PRESENT ILLNESS
[FreeTextEntry1] : Presenting for physical exam [de-identified] : Patient is an 84 yo M w PMHx HTN, HLD, arrythmias (PACs), mod. MVR, pancreatic insufficiency, BPH, HFrEF (EF 40-45%), basal cell carcinoma (s/p x2 Mohs surgery) presenting to the clinic for follow up.   Cardiac history - He states that he has history of PAC's. Follows with EP at [place] who performed ablation earlier this year but PAC's have returned. Scheduled for another ablation with them soon. Remains on metoprolol and Xarelto. Also follows closely with Dr. Isaac for management of his hyperlipidemia. Is on rosuvastatin 10 mg and zetia 10 mg with good control. Also follows with Dr. Gonzalez's with regards to his HFrEF, most recent echo evidencing EF of 40-45%. Has history of mitral regurgitation which patient believes has been causing his PAC's. Also hisotry of ascending aortic aneurysm last measured via CT to be 4.1 and via last echo at 4.0 cm.   HTN- He checks at home regularly stating that his pressures are within 110's and 120's systolically. He takes his medications daily which are amlodipine 10 mg qd and telmisartan 80 m gqd. Also is on metoprolol 12.5 mg qd for his PAC's. Denies any complaints of chest pain, headaches, fatigue, dizziness, hematuria, visual complaints.   GI- He is following a GI physician associated with [facility] for his pancreatic insufficiency. is prescribed Creon which he continues to take daily. Had colonoscopy in 2016 which was normal and recently but was not complete colonoscopy given poor preparation. Was provided Cologuard test instead of undergoing another colonoscopy, especially given his age, which patient states was normal. Is due for Cologuard test at the end of this year that his GI physician provided him with a referral for.   BPH - Takes tadalafil 5 mg qd and tamsulosin .4 mg qd daily. Denies frequent urination, inability to completely empty bladder, slow stream, getting up frequently at night.   BCC - Had prior Moh's surgery twice. Will be following up with Dermatology again soon.

## 2024-08-14 NOTE — HISTORY OF PRESENT ILLNESS
[FreeTextEntry1] : Presenting for physical exam [de-identified] : Patient is an 82 yo M w PMHx HTN, HLD, arrythmias (PACs), mod. MVR, pancreatic insufficiency, BPH, HFrEF (EF 40-45%), basal cell carcinoma (s/p x2 Mohs surgery) presenting to the clinic for follow up.   Cardiac history - He states that he has history of PAC's. Follows with EP at [place] who performed ablation earlier this year but PAC's have returned. Scheduled for another ablation with them soon. Remains on metoprolol and Xarelto. Also follows closely with Dr. Isaac for management of his hyperlipidemia. Is on rosuvastatin 10 mg and zetia 10 mg with good control. Also follows with Dr. Gonzalez's with regards to his HFrEF, most recent echo evidencing EF of 40-45%. Has history of mitral regurgitation which patient believes has been causing his PAC's. Also hisotry of ascending aortic aneurysm last measured via CT to be 4.1 and via last echo at 4.0 cm.   HTN- He checks at home regularly stating that his pressures are within 110's and 120's systolically. He takes his medications daily which are amlodipine 10 mg qd and telmisartan 80 m gqd. Also is on metoprolol 12.5 mg qd for his PAC's. Denies any complaints of chest pain, headaches, fatigue, dizziness, hematuria, visual complaints.   GI- He is following a GI physician associated with [facility] for his pancreatic insufficiency. is prescribed Creon which he continues to take daily. Had colonoscopy in 2016 which was normal and recently but was not complete colonoscopy given poor preparation. Was provided Cologuard test instead of undergoing another colonoscopy, especially given his age, which patient states was normal. Is due for Cologuard test at the end of this year that his GI physician provided him with a referral for.   BPH - Takes tadalafil 5 mg qd and tamsulosin .4 mg qd daily. Denies frequent urination, inability to completely empty bladder, slow stream, getting up frequently at night.   BCC - Had prior Moh's surgery twice. Will be following up with Dermatology again soon.

## 2024-08-14 NOTE — ASSESSMENT
[FreeTextEntry1] : Patient is an 84 yo M w PMHx HTN, HLD, arrythmias (PACs), mod. MVR, pancreatic insufficiency, BPH, HFrEF (EF 40-45%), basal cell carcinoma (s/p x2 Mohs surgery) presenting to the clinic for follow up.   #PAC's/pSVT History of PAC's. Follows with EP at [place] who performed ablation earlier this year but PAC's have returned. Scheduled for another ablation with them soon. Remains on metoprolol and Xarelto.  - C/w Metoprolol 12.5 mg qd (splits 25 mg pill in half) and Xarelto 20 mg qd  #HFrEF EF of 40-45% based off recent echo. Follows with Dr. Gonzalez's. Asymptomatic.  - On metoprolol 12.5 mg qd for PAC's/pSVT and telmisartan for BP  - Continue to follow up with Dr. Gonzalez's as needed  #HLD Follows closely with Dr. Isaac for management of his hyperlipidemia. Is on rosuvastatin 10 mg and Zetia 10 mg with good control.  Triglycerides 55, total cholesterol 105, HDL 45, LDL 46.  - C/w rosuvastatin 10 mg qd and Zetia 10 mg qd - Continue to follow up with Dr. Isaac  #Mitral Regurgitation Has history of mitral regurgitation. Denies current sob, orthopnea.  - Continue to follow up with cardiology   #Ascending Aortic Aneurysm Last measured via CT to be 4.1 and via last echo at 4.0 cm. Prior CT evidencing 4.6 cm. - Continue to monitor  #HTN Checks at home regularly w/ pressures within 110's and 120's systolically. Takes his medications daily which are amlodipine 10 mg qd and telmisartan 80 mg qd. Also is on metoprolol 12.5 qd for his PAC's. Denies any complaints of chest pain, headaches, fatigue, dizziness, hematuria, visual complaints.  - C/w Telmisartan 80 mg qd, amlodipine 10 mg qd and metoprolol 12.5 mg qd   #Pancreatic Insufficiency Following a GI physician associated with [facility] for his pancreatic insufficiency. is prescribed Creon which he continues to take daily.  - C/w Creon - C/w GI follow up as needed  #BPH  Takes tadalafil 5 mg qd and tamsulosin .4mg  qd daily. Denies frequent urination, inability to completely empty bladder, slow stream, getting up frequently at night.  - C/w tadalafil 5 mg qd and tamsulosin .4 mg qd  #Hx of BCC  Had prior Moh's surgery twice. Will be following up with Dermatology again soon.  #Thrombocytopenia Patient with abnormal platelet value of 138 earlier this year in April. No overt signs of bleeding. Does admit to gums easily bleeding when brushing teeth.  - F/u CBC  - If platelets remain low, will w/u at next visit   #HCM Screening: Colonoscopy in 2016 normal per patient, normal Cologuard test 1-2 years ago. Scheduled to receive Cologuard test by GI provider later this year.   RTC in 3 months for Annual Wellness Physical

## 2024-08-14 NOTE — ASSESSMENT
[FreeTextEntry1] : Patient is an 84 yo M w PMHx HTN, HLD, arrythmias (PACs), mod. MVR, pancreatic insufficiency, BPH, HFrEF (EF 40-45%), basal cell carcinoma (s/p x2 Mohs surgery) presenting to the clinic for follow up.   #PAC's/pSVT History of PAC's. Follows with EP at [facility] who performed ablation earlier this year but PAC's have returned. Scheduled for another ablation with them soon. Remains on metoprolol and Xarelto.  - C/w Metoprolol 12.5 mg qd (splits 25 mg pill in half) and Xarelto 20 mg qd  #HFrEF EF of 40-45% based off recent echo. Follows with Dr. Gonzalez's. Asymptomatic.  - On metoprolol 12.5 mg qd for PAC's/pSVT and telmisartan for BP  - Continue to follow up with Dr. Gonzalez's as needed  #HLD Follows closely with Dr. Isaac for management of his hyperlipidemia. Is on rosuvastatin 10 mg and Zetia 10 mg with good control.  Triglycerides 55, total cholesterol 105, HDL 45, LDL 46.  - C/w rosuvastatin 10 mg qd and Zetia 10 mg qd - Continue to follow up with Dr. Isaac  #Mitral Regurgitation Has history of mitral regurgitation. Denies current sob, orthopnea.  - Continue to follow up with cardiology   #Ascending Aortic Aneurysm Last measured via CT to be 4.1 and via last echo at 4.0 cm. Prior CT evidencing 4.6 cm. - Continue to monitor  #HTN Checks at home regularly w/ pressures within 110's and 120's systolically. Takes his medications daily which are amlodipine 10 mg qd and telmisartan 80 mg qd. Also is on metoprolol 12.5 qd for his PAC's. Denies any complaints of chest pain, headaches, fatigue, dizziness, hematuria, visual complaints.  - C/w Telmisartan 80 mg qd, amlodipine 10 mg qd and metoprolol 12.5 mg qd   #Pancreatic Insufficiency Following a GI physician associated with [facility] for his pancreatic insufficiency. is prescribed Creon which he continues to take daily.  - C/w Creon - C/w GI follow up as needed  #BPH  Takes tadalafil 5 mg qd and tamsulosin .4mg  qd daily. Denies frequent urination, inability to completely empty bladder, slow stream, getting up frequently at night.  - C/w tadalafil 5 mg qd and tamsulosin .4 mg qd  #Hx of BCC  Had prior Moh's surgery twice. Will be following up with Dermatology again soon.  #Thrombocytopenia Patient with abnormal platelet value of 138 earlier this year in April. No overt signs of bleeding. Does admit to gums easily bleeding when brushing teeth.  - F/u CBC  - If platelets remain low, will w/u at next visit   #HCM Screening: Colonoscopy in 2016 normal per patient, normal Cologuard test 1-2 years ago. Scheduled to receive Cologuard test by GI provider later this year.   RTC in 3 months for Annual Wellness Physical

## 2024-08-14 NOTE — HISTORY OF PRESENT ILLNESS
[FreeTextEntry1] : Presenting for physical exam [de-identified] : Patient is an 82 yo M w PMHx HTN, HLD, arrythmias (PACs), mod. MVR, pancreatic insufficiency, BPH, HFrEF (EF 40-45%), basal cell carcinoma (s/p x2 Mohs surgery) presenting to the clinic for follow up.   Cardiac history - He states that he has history of PAC's. Follows with EP at [facility] who performed ablation earlier this year but PAC's have returned. Scheduled for another ablation with them soon. Remains on metoprolol and Xarelto. Also follows closely with Dr. Isaac for management of his hyperlipidemia. Is on rosuvastatin 10 mg and zetia 10 mg with good control. Also follows with Dr. Gonzalez's with regards to his HFrEF, most recent echo evidencing EF of 40-45%. Has history of mitral regurgitation which patient believes has been causing his PAC's. Also hisotry of ascending aortic aneurysm last measured via CT to be 4.1 and via last echo at 4.0 cm.   HTN- He checks at home regularly stating that his pressures are within 110's and 120's systolically. He takes his medications daily which are amlodipine 10 mg qd and telmisartan 80 m gqd. Also is on metoprolol 12.5 mg qd for his PAC's. Denies any complaints of chest pain, headaches, fatigue, dizziness, hematuria, visual complaints.   GI- He is following a GI physician associated with [facility] for his pancreatic insufficiency. is prescribed Creon which he continues to take daily. Had colonoscopy in 2016 which was normal and recently but was not complete colonoscopy given poor preparation. Was provided Cologuard test instead of undergoing another colonoscopy, especially given his age, which patient states was normal. Is due for Cologuard test at the end of this year that his GI physician provided him with a referral for.   BPH - Takes tadalafil 5 mg qd and tamsulosin .4 mg qd daily. Denies frequent urination, inability to completely empty bladder, slow stream, getting up frequently at night.   BCC - Had prior Moh's surgery twice. Will be following up with Dermatology again soon.

## 2024-08-14 NOTE — END OF VISIT
[] : Resident [FreeTextEntry3] : 84-year-old male presenting for follow-up.  He has questions about pneumococcal vaccination.  Reports that he had a PCV 20 about 10 years ago in 2014, had PPSV23 in 2020, wonders if he needs additional pneumococcal vaccination.  He follows with Dr. Isaac in lipid clinic, he also follows with an electrophysiologist at [facility].  No acute complaints.  Well-appearing on exam.  Will repeat CBC today given mild thrombocytopenia on prior labs.  Will check urine albumin given history of hypertension.  Will plan to administer PCV 20 next year in 2025 as that will be 5 years after his last pneumococcal vaccine, his pneumococcal vaccine sedation will be considered complete at that time.  Return to clinic in October or November for CPE, earlier as needed.

## 2024-08-14 NOTE — ASSESSMENT
[FreeTextEntry1] : Patient is an 82 yo M w PMHx HTN, HLD, arrythmias (PACs), mod. MVR, pancreatic insufficiency, BPH, HFrEF (EF 40-45%), basal cell carcinoma (s/p x2 Mohs surgery) presenting to the clinic for follow up.   #PAC's/pSVT History of PAC's. Follows with EP at [place] who performed ablation earlier this year but PAC's have returned. Scheduled for another ablation with them soon. Remains on metoprolol and Xarelto.  - C/w Metoprolol 12.5 mg qd (splits 25 mg pill in half) and Xarelto 20 mg qd  #HFrEF EF of 40-45% based off recent echo. Follows with Dr. Gonzalez's. Asymptomatic.  - On metoprolol 12.5 mg qd for PAC's/pSVT and telmisartan for BP  - Continue to follow up with Dr. Gonzalez's as needed  #HLD Follows closely with Dr. Isaac for management of his hyperlipidemia. Is on rosuvastatin 10 mg and Zetia 10 mg with good control.  Triglycerides 55, total cholesterol 105, HDL 45, LDL 46.  - C/w rosuvastatin 10 mg qd and Zetia 10 mg qd - Continue to follow up with Dr. Isaac  #Mitral Regurgitation Has history of mitral regurgitation. Denies current sob, orthopnea.  - Continue to follow up with cardiology   #Ascending Aortic Aneurysm Last measured via CT to be 4.1 and via last echo at 4.0 cm. Prior CT evidencing 4.6 cm. - Continue to monitor  #HTN Checks at home regularly w/ pressures within 110's and 120's systolically. Takes his medications daily which are amlodipine 10 mg qd and telmisartan 80 mg qd. Also is on metoprolol 12.5 qd for his PAC's. Denies any complaints of chest pain, headaches, fatigue, dizziness, hematuria, visual complaints.  - C/w Telmisartan 80 mg qd, amlodipine 10 mg qd and metoprolol 12.5 mg qd   #Pancreatic Insufficiency Following a GI physician associated with [facility] for his pancreatic insufficiency. is prescribed Creon which he continues to take daily.  - C/w Creon - C/w GI follow up as needed  #BPH  Takes tadalafil 5 mg qd and tamsulosin .4mg  qd daily. Denies frequent urination, inability to completely empty bladder, slow stream, getting up frequently at night.  - C/w tadalafil 5 mg qd and tamsulosin .4 mg qd  #Hx of BCC  Had prior Moh's surgery twice. Will be following up with Dermatology again soon.  #Thrombocytopenia Patient with abnormal platelet value of 138 earlier this year in April. No overt signs of bleeding. Does admit to gums easily bleeding when brushing teeth.  - F/u CBC  - If platelets remain low, will w/u at next visit   #HCM Screening: Colonoscopy in 2016 normal per patient, normal Cologuard test 1-2 years ago. Scheduled to receive Cologuard test by GI provider later this year.   RTC in 3 months for Annual Wellness Physical

## 2024-08-20 LAB
CREAT SPEC-SCNC: 42 MG/DL
HCT VFR BLD CALC: 46.9 %
HGB BLD-MCNC: 14.9 G/DL
MCHC RBC-ENTMCNC: 31.8 GM/DL
MCHC RBC-ENTMCNC: 31.8 PG
MCV RBC AUTO: 100 FL
MICROALBUMIN 24H UR DL<=1MG/L-MCNC: 1.9 MG/DL
MICROALBUMIN/CREAT 24H UR-RTO: 45 MG/G
PLATELET # BLD AUTO: 147 K/UL
RBC # BLD: 4.69 M/UL
RBC # FLD: 14.3 %
WBC # FLD AUTO: 5.93 K/UL

## 2024-09-04 ENCOUNTER — NON-APPOINTMENT (OUTPATIENT)
Age: 84
End: 2024-09-04

## 2024-09-05 ENCOUNTER — APPOINTMENT (OUTPATIENT)
Dept: NEUROLOGY | Facility: CLINIC | Age: 84
End: 2024-09-05
Payer: MEDICARE

## 2024-09-05 ENCOUNTER — NON-APPOINTMENT (OUTPATIENT)
Age: 84
End: 2024-09-05

## 2024-09-05 VITALS
HEART RATE: 48 BPM | BODY MASS INDEX: 24.77 KG/M2 | TEMPERATURE: 97.8 F | OXYGEN SATURATION: 97 % | WEIGHT: 173 LBS | SYSTOLIC BLOOD PRESSURE: 118 MMHG | DIASTOLIC BLOOD PRESSURE: 62 MMHG | HEIGHT: 70 IN

## 2024-09-05 DIAGNOSIS — L29.9 PRURITUS, UNSPECIFIED: ICD-10-CM

## 2024-09-05 PROCEDURE — 99213 OFFICE O/P EST LOW 20 MIN: CPT

## 2024-09-05 RX ORDER — APIXABAN 5 MG/1
5 TABLET, FILM COATED ORAL
Refills: 0 | Status: ACTIVE | COMMUNITY

## 2024-09-05 NOTE — PHYSICAL EXAM
[General Appearance - Alert] : alert [General Appearance - In No Acute Distress] : in no acute distress [Oriented To Time, Place, And Person] : oriented to person, place, and time [Person] : oriented to person [Place] : oriented to place [Time] : oriented to time [Fluency] : fluency intact [Cranial Nerves Oculomotor (III)] : extraocular motion intact [Cranial Nerves Facial (VII)] : face symmetrical [Cranial Nerves Vestibulocochlear (VIII)] : hearing was intact bilaterally [FreeTextEntry6] : moving all extremities

## 2024-09-05 NOTE — DISCUSSION/SUMMARY
[FreeTextEntry1] : 85 y/o M previously seen for L facial tingling and itching possibly trigeminal neuralgia with reportedly normal workup in the past; this has resolved and GBP tapered off ~2 years ago. Now patient complaining of generalized pruritus- well controlled on GBP 600mg TID Discussed that patient has remained stable on current dose for about 2 years. Recommended to start tapering GBP by 300mg each week as tolerated to re-evaluate symptoms and need for meds. F/u in 6-12 months

## 2024-09-05 NOTE — HISTORY OF PRESENT ILLNESS
[FreeTextEntry1] : Interim Hx: 9/5/2024 Patient reports overall doing well.  Diffuse itching has been controlled with GPB 600mg TID. No significant side effects. Does not feel sleepiness or dizziness. Still gets some in lower back itching in AM.  _________________ Interim Hx: 2/24/2022 Patient last seen by Dr. Crystal 9/17/2020.  Presents today for a new complaint. He states over the last few months he has developed increasing systemic itching, worse at night. He saw Derm 9/2021 who recommended moisturizer, however patient unable to apply everywhere. He is interested in trying GBP again as he read online it can help with systemic itching and he had previously been on it (for trigeminal neuralgia) without side effects.  History below reviewed. __________________ Interim HX: 9/17/2020 [ Previously seen by Dr. Forde who has since moved to a new state] Dr. Forde A/P: L facial tingling/itchy ? TN. Nearly resolved on GBP. Taper off GBP  Saw Dr. Horvath at [facility] several years ago and had  MRI brain/neck - negative. Was having paresthesias L face/ neck and inner ear. Was put on 2400mg/day of GBP. It helped, and 2 yrs later it disappeared Dr. SIMENTAL tapered him off last year. Paresthesias have never recurred.  Recently resumed GBP 300mg QHS for sleep and inc to 600mg with minimal effects on sleep.  Has ortho pains causing inability to sleep. Has "bone on bone" in L shoulder. Has not done anything for the pain. Was told PT and shoulder replacement but he doesn't want to do it.  Was recommended to try voltaren. Tries supplements, walking, relaxation nothing promotes sleep.   ROS- No F/C, no anxiety all other ROS negative.  -- DR. CHAVARRIA CHART NOTES: 78 year old M, referred by Dr. Jay for L facial tingling and itching involving the ear, cheek and into the L neck, as per records was given dx of "trigeminal neuralgia". Symptoms started about 3 years ago.  Was started on gabapentin which provided relief.  Symptoms have mostly resolved over a year ago.  Occasional gets itching of the neck but isnt sure if its related.  Reports 95+ percent improvement.  However remains on gabapentin and would like to try to stop it.  On gabapentin 600-900-900 daily.    Reports having had MR brain and c spine which was nondiagnostic, ordered by his prior neurologist Dr. Kenney.  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

## 2024-10-21 ENCOUNTER — RX RENEWAL (OUTPATIENT)
Age: 84
End: 2024-10-21

## 2024-10-28 ENCOUNTER — RX RENEWAL (OUTPATIENT)
Age: 84
End: 2024-10-28

## 2024-12-16 ENCOUNTER — APPOINTMENT (OUTPATIENT)
Dept: HEART AND VASCULAR | Facility: CLINIC | Age: 84
End: 2024-12-16
Payer: MEDICARE

## 2024-12-16 PROCEDURE — 93306 TTE W/DOPPLER COMPLETE: CPT

## 2024-12-17 LAB
ALBUMIN SERPL ELPH-MCNC: 4.6 G/DL
ALP BLD-CCNC: 71 U/L
ALT SERPL-CCNC: 22 U/L
ANION GAP SERPL CALC-SCNC: 10 MMOL/L
APO B SERPL-MCNC: 72 MG/DL
AST SERPL-CCNC: 28 U/L
BASOPHILS # BLD AUTO: 0.04 K/UL
BASOPHILS NFR BLD AUTO: 0.6 %
BILIRUB SERPL-MCNC: 0.3 MG/DL
BUN SERPL-MCNC: 24 MG/DL
CALCIUM SERPL-MCNC: 9.4 MG/DL
CHLORIDE SERPL-SCNC: 105 MMOL/L
CHOLEST SERPL-MCNC: 114 MG/DL
CO2 SERPL-SCNC: 27 MMOL/L
CREAT SERPL-MCNC: 0.87 MG/DL
EGFR: 85 ML/MIN/1.73M2
EOSINOPHIL # BLD AUTO: 0.17 K/UL
EOSINOPHIL NFR BLD AUTO: 2.5 %
ESTIMATED AVERAGE GLUCOSE: 108 MG/DL
GLUCOSE SERPL-MCNC: 102 MG/DL
HBA1C MFR BLD HPLC: 5.4 %
HCT VFR BLD CALC: 44.7 %
HDLC SERPL-MCNC: 40 MG/DL
HGB BLD-MCNC: 14.4 G/DL
IMM GRANULOCYTES NFR BLD AUTO: 0.3 %
LDLC SERPL CALC-MCNC: 61 MG/DL
LYMPHOCYTES # BLD AUTO: 1.4 K/UL
LYMPHOCYTES NFR BLD AUTO: 20.4 %
MAN DIFF?: NORMAL
MCHC RBC-ENTMCNC: 31.6 PG
MCHC RBC-ENTMCNC: 32.2 G/DL
MCV RBC AUTO: 98 FL
MONOCYTES # BLD AUTO: 0.56 K/UL
MONOCYTES NFR BLD AUTO: 8.2 %
NEUTROPHILS # BLD AUTO: 4.67 K/UL
NEUTROPHILS NFR BLD AUTO: 68 %
NONHDLC SERPL-MCNC: 74 MG/DL
NT-PROBNP SERPL-MCNC: 266 PG/ML
PLATELET # BLD AUTO: 146 K/UL
POTASSIUM SERPL-SCNC: 4.8 MMOL/L
PROT SERPL-MCNC: 6.7 G/DL
RBC # BLD: 4.56 M/UL
RBC # FLD: 13.7 %
SODIUM SERPL-SCNC: 142 MMOL/L
TRIGL SERPL-MCNC: 61 MG/DL
TSH SERPL-ACNC: 3.73 UIU/ML
WBC # FLD AUTO: 6.86 K/UL

## 2024-12-19 ENCOUNTER — APPOINTMENT (OUTPATIENT)
Dept: HEART AND VASCULAR | Facility: CLINIC | Age: 84
End: 2024-12-19

## 2024-12-19 VITALS
BODY MASS INDEX: 25.05 KG/M2 | OXYGEN SATURATION: 96 % | TEMPERATURE: 97.6 F | HEIGHT: 70 IN | DIASTOLIC BLOOD PRESSURE: 66 MMHG | SYSTOLIC BLOOD PRESSURE: 126 MMHG | HEART RATE: 52 BPM | WEIGHT: 175 LBS

## 2024-12-19 DIAGNOSIS — I10 ESSENTIAL (PRIMARY) HYPERTENSION: ICD-10-CM

## 2024-12-19 DIAGNOSIS — I25.10 ATHEROSCLEROTIC HEART DISEASE OF NATIVE CORONARY ARTERY W/OUT ANGINA PECTORIS: ICD-10-CM

## 2024-12-19 DIAGNOSIS — E78.5 HYPERLIPIDEMIA, UNSPECIFIED: ICD-10-CM

## 2024-12-19 DIAGNOSIS — I65.29 OCCLUSION AND STENOSIS OF UNSPECIFIED CAROTID ARTERY: ICD-10-CM

## 2024-12-19 PROCEDURE — 93000 ELECTROCARDIOGRAM COMPLETE: CPT

## 2024-12-19 PROCEDURE — 99214 OFFICE O/P EST MOD 30 MIN: CPT | Mod: 25

## 2024-12-19 RX ORDER — AMLODIPINE BESYLATE 10 MG/1
10 TABLET ORAL
Qty: 90 | Refills: 3 | Status: ACTIVE | COMMUNITY
Start: 2024-12-19 | End: 1900-01-01

## 2024-12-19 RX ORDER — ROSUVASTATIN CALCIUM 20 MG/1
20 TABLET, FILM COATED ORAL
Qty: 90 | Refills: 3 | Status: ACTIVE | COMMUNITY
Start: 2024-12-19 | End: 1900-01-01

## 2024-12-20 ENCOUNTER — APPOINTMENT (OUTPATIENT)
Dept: INTERNAL MEDICINE | Facility: CLINIC | Age: 84
End: 2024-12-20
Payer: MEDICARE

## 2024-12-20 ENCOUNTER — APPOINTMENT (OUTPATIENT)
Dept: HEART AND VASCULAR | Facility: CLINIC | Age: 84
End: 2024-12-20

## 2024-12-20 VITALS
OXYGEN SATURATION: 97 % | HEART RATE: 51 BPM | TEMPERATURE: 97.8 F | WEIGHT: 175 LBS | HEIGHT: 70 IN | BODY MASS INDEX: 25.05 KG/M2 | DIASTOLIC BLOOD PRESSURE: 64 MMHG | SYSTOLIC BLOOD PRESSURE: 114 MMHG

## 2024-12-20 DIAGNOSIS — I42.8 OTHER CARDIOMYOPATHIES: ICD-10-CM

## 2024-12-20 DIAGNOSIS — I48.0 PAROXYSMAL ATRIAL FIBRILLATION: ICD-10-CM

## 2024-12-20 PROCEDURE — G2211 COMPLEX E/M VISIT ADD ON: CPT

## 2024-12-20 PROCEDURE — 99213 OFFICE O/P EST LOW 20 MIN: CPT | Mod: GC

## 2024-12-26 ENCOUNTER — RX RENEWAL (OUTPATIENT)
Age: 84
End: 2024-12-26

## 2024-12-27 ENCOUNTER — APPOINTMENT (OUTPATIENT)
Dept: DERMATOLOGY | Facility: CLINIC | Age: 84
End: 2024-12-27
Payer: MEDICARE

## 2024-12-27 VITALS — BODY MASS INDEX: 25.05 KG/M2 | HEIGHT: 70 IN | WEIGHT: 175 LBS

## 2024-12-27 DIAGNOSIS — D22.9 MELANOCYTIC NEVI, UNSPECIFIED: ICD-10-CM

## 2024-12-27 DIAGNOSIS — L30.9 DERMATITIS, UNSPECIFIED: ICD-10-CM

## 2024-12-27 DIAGNOSIS — L57.0 ACTINIC KERATOSIS: ICD-10-CM

## 2024-12-27 DIAGNOSIS — L85.3 XEROSIS CUTIS: ICD-10-CM

## 2024-12-27 PROCEDURE — 17000 DESTRUCT PREMALG LESION: CPT

## 2024-12-27 PROCEDURE — 17003 DESTRUCT PREMALG LES 2-14: CPT

## 2024-12-27 PROCEDURE — 99214 OFFICE O/P EST MOD 30 MIN: CPT | Mod: 25

## 2024-12-27 RX ORDER — TRIAMCINOLONE ACETONIDE 1 MG/G
0.1 OINTMENT TOPICAL
Qty: 1 | Refills: 0 | Status: ACTIVE | COMMUNITY
Start: 2024-12-27 | End: 1900-01-01

## 2025-01-22 ENCOUNTER — APPOINTMENT (OUTPATIENT)
Dept: UROLOGY | Facility: CLINIC | Age: 85
End: 2025-01-22
Payer: MEDICARE

## 2025-01-22 DIAGNOSIS — N52.9 MALE ERECTILE DYSFUNCTION, UNSPECIFIED: ICD-10-CM

## 2025-01-22 DIAGNOSIS — N40.1 BENIGN PROSTATIC HYPERPLASIA WITH LOWER URINARY TRACT SYMPMS: ICD-10-CM

## 2025-01-22 DIAGNOSIS — R35.1 BENIGN PROSTATIC HYPERPLASIA WITH LOWER URINARY TRACT SYMPMS: ICD-10-CM

## 2025-01-22 DIAGNOSIS — N43.3 HYDROCELE, UNSPECIFIED: ICD-10-CM

## 2025-01-22 LAB
BILIRUB UR QL STRIP: NORMAL
CLARITY UR: CLEAR
COLLECTION METHOD: NORMAL
GLUCOSE UR-MCNC: NORMAL
HCG UR QL: 0.2 EU/DL
HGB UR QL STRIP.AUTO: NORMAL
KETONES UR-MCNC: NORMAL
LEUKOCYTE ESTERASE UR QL STRIP: NORMAL
NITRITE UR QL STRIP: NORMAL
PH UR STRIP: 5
PROT UR STRIP-MCNC: NORMAL
SP GR UR STRIP: 1.02

## 2025-01-22 PROCEDURE — 51798 US URINE CAPACITY MEASURE: CPT

## 2025-01-22 PROCEDURE — 81003 URINALYSIS AUTO W/O SCOPE: CPT | Mod: QW

## 2025-01-22 PROCEDURE — 99214 OFFICE O/P EST MOD 30 MIN: CPT

## 2025-01-24 ENCOUNTER — NON-APPOINTMENT (OUTPATIENT)
Age: 85
End: 2025-01-24

## 2025-01-24 LAB — BACTERIA UR CULT: NORMAL

## 2025-01-30 ENCOUNTER — APPOINTMENT (OUTPATIENT)
Dept: UROLOGY | Facility: CLINIC | Age: 85
End: 2025-01-30

## 2025-01-30 ENCOUNTER — NON-APPOINTMENT (OUTPATIENT)
Age: 85
End: 2025-01-30

## 2025-03-07 ENCOUNTER — APPOINTMENT (OUTPATIENT)
Dept: UROLOGY | Facility: CLINIC | Age: 85
End: 2025-03-07

## 2025-03-25 ENCOUNTER — NON-APPOINTMENT (OUTPATIENT)
Age: 85
End: 2025-03-25

## 2025-05-22 ENCOUNTER — RX RENEWAL (OUTPATIENT)
Age: 85
End: 2025-05-22

## 2025-06-10 ENCOUNTER — NON-APPOINTMENT (OUTPATIENT)
Age: 85
End: 2025-06-10

## 2025-06-11 ENCOUNTER — APPOINTMENT (OUTPATIENT)
Dept: HEART AND VASCULAR | Facility: CLINIC | Age: 85
End: 2025-06-11
Payer: MEDICARE

## 2025-06-11 PROCEDURE — 36415 COLL VENOUS BLD VENIPUNCTURE: CPT

## 2025-06-12 LAB
ANION GAP SERPL CALC-SCNC: 11 MMOL/L
BUN SERPL-MCNC: 20 MG/DL
CALCIUM SERPL-MCNC: 9.6 MG/DL
CHLORIDE SERPL-SCNC: 104 MMOL/L
CO2 SERPL-SCNC: 25 MMOL/L
CREAT SERPL-MCNC: 0.9 MG/DL
EGFRCR SERPLBLD CKD-EPI 2021: 84 ML/MIN/1.73M2
GLUCOSE SERPL-MCNC: 107 MG/DL
HCT VFR BLD CALC: 43.1 %
HGB BLD-MCNC: 13.3 G/DL
MCHC RBC-ENTMCNC: 29.8 PG
MCHC RBC-ENTMCNC: 30.9 G/DL
MCV RBC AUTO: 96.4 FL
NT-PROBNP SERPL-MCNC: 246 PG/ML
PLATELET # BLD AUTO: 165 K/UL
POTASSIUM SERPL-SCNC: 5.1 MMOL/L
RBC # BLD: 4.47 M/UL
RBC # FLD: 14 %
SODIUM SERPL-SCNC: 140 MMOL/L
WBC # FLD AUTO: 5.59 K/UL

## 2025-06-16 ENCOUNTER — APPOINTMENT (OUTPATIENT)
Dept: HEART AND VASCULAR | Facility: CLINIC | Age: 85
End: 2025-06-16
Payer: MEDICARE

## 2025-06-16 VITALS
HEART RATE: 52 BPM | BODY MASS INDEX: 24.77 KG/M2 | SYSTOLIC BLOOD PRESSURE: 122 MMHG | HEIGHT: 70 IN | RESPIRATION RATE: 17 BRPM | DIASTOLIC BLOOD PRESSURE: 66 MMHG | WEIGHT: 173 LBS

## 2025-06-16 PROCEDURE — 93000 ELECTROCARDIOGRAM COMPLETE: CPT

## 2025-06-16 PROCEDURE — 99215 OFFICE O/P EST HI 40 MIN: CPT

## 2025-06-16 PROCEDURE — G2211 COMPLEX E/M VISIT ADD ON: CPT

## 2025-06-17 ENCOUNTER — APPOINTMENT (OUTPATIENT)
Dept: INTERNAL MEDICINE | Facility: CLINIC | Age: 85
End: 2025-06-17
Payer: MEDICARE

## 2025-06-17 ENCOUNTER — MED ADMIN CHARGE (OUTPATIENT)
Age: 85
End: 2025-06-17

## 2025-06-17 VITALS
SYSTOLIC BLOOD PRESSURE: 135 MMHG | RESPIRATION RATE: 12 BRPM | HEART RATE: 47 BPM | DIASTOLIC BLOOD PRESSURE: 69 MMHG | OXYGEN SATURATION: 99 % | TEMPERATURE: 98.1 F

## 2025-06-17 LAB — CRP SERPL HS-MCNC: 0.58 MG/L

## 2025-06-17 PROCEDURE — G0009: CPT

## 2025-06-17 PROCEDURE — 90677 PCV20 VACCINE IM: CPT

## 2025-06-17 PROCEDURE — 36415 COLL VENOUS BLD VENIPUNCTURE: CPT

## 2025-06-17 PROCEDURE — G0439: CPT | Mod: GC

## 2025-06-17 RX ORDER — FLUTICASONE PROPIONATE 50 UG/1
50 SPRAY NASAL DAILY
Qty: 1 | Refills: 0 | Status: ACTIVE | COMMUNITY
Start: 2025-06-17 | End: 1900-01-01

## 2025-06-19 ENCOUNTER — TRANSCRIPTION ENCOUNTER (OUTPATIENT)
Age: 85
End: 2025-06-19

## 2025-06-19 LAB
ALT SERPL-CCNC: 38 U/L
AST SERPL-CCNC: 59 U/L
CREAT SPEC-SCNC: 20 MG/DL
ESTIMATED AVERAGE GLUCOSE: 114 MG/DL
HBA1C MFR BLD HPLC: 5.6 %
MICROALBUMIN 24H UR DL<=1MG/L-MCNC: <1.2 MG/DL
MICROALBUMIN/CREAT 24H UR-RTO: NORMAL MG/G

## 2025-07-10 ENCOUNTER — RX RENEWAL (OUTPATIENT)
Age: 85
End: 2025-07-10

## 2025-07-14 ENCOUNTER — RX RENEWAL (OUTPATIENT)
Age: 85
End: 2025-07-14

## 2025-08-14 ENCOUNTER — APPOINTMENT (OUTPATIENT)
Dept: INTERNAL MEDICINE | Facility: CLINIC | Age: 85
End: 2025-08-14

## 2025-08-22 ENCOUNTER — APPOINTMENT (OUTPATIENT)
Dept: INTERNAL MEDICINE | Facility: CLINIC | Age: 85
End: 2025-08-22
Payer: MEDICARE

## 2025-08-22 VITALS
TEMPERATURE: 97.6 F | DIASTOLIC BLOOD PRESSURE: 69 MMHG | WEIGHT: 167.31 LBS | HEART RATE: 49 BPM | SYSTOLIC BLOOD PRESSURE: 134 MMHG | OXYGEN SATURATION: 97 % | BODY MASS INDEX: 23.95 KG/M2 | HEIGHT: 70 IN

## 2025-08-22 DIAGNOSIS — Z87.2 PERSONAL HISTORY OF DISEASES OF THE SKIN AND SUBCUTANEOUS TISSUE: ICD-10-CM

## 2025-08-22 DIAGNOSIS — R14.0 ABDOMINAL DISTENSION (GASEOUS): ICD-10-CM

## 2025-08-22 DIAGNOSIS — L85.3 XEROSIS CUTIS: ICD-10-CM

## 2025-08-22 DIAGNOSIS — R35.1 BENIGN PROSTATIC HYPERPLASIA WITH LOWER URINARY TRACT SYMPMS: ICD-10-CM

## 2025-08-22 DIAGNOSIS — N43.40 SPERMATOCELE OF EPIDIDYMIS, UNSPECIFIED: ICD-10-CM

## 2025-08-22 DIAGNOSIS — R93.1 ABNORMAL FINDINGS ON DIAGNOSTIC IMAGING OF HEART AND CORONARY CIRCULATION: ICD-10-CM

## 2025-08-22 DIAGNOSIS — Z13.1 ENCOUNTER FOR SCREENING FOR DIABETES MELLITUS: ICD-10-CM

## 2025-08-22 DIAGNOSIS — Z86.79 PERSONAL HISTORY OF OTHER DISEASES OF THE CIRCULATORY SYSTEM: ICD-10-CM

## 2025-08-22 DIAGNOSIS — M65.311 TRIGGER THUMB, RIGHT THUMB: ICD-10-CM

## 2025-08-22 DIAGNOSIS — Z00.00 ENCOUNTER FOR GENERAL ADULT MEDICAL EXAMINATION W/OUT ABNORMAL FINDINGS: ICD-10-CM

## 2025-08-22 DIAGNOSIS — Z87.898 PERSONAL HISTORY OF OTHER SPECIFIED CONDITIONS: ICD-10-CM

## 2025-08-22 DIAGNOSIS — Z92.29 PERSONAL HISTORY OF OTHER DRUG THERAPY: ICD-10-CM

## 2025-08-22 DIAGNOSIS — Z85.828 PERSONAL HISTORY OF OTHER MALIGNANT NEOPLASM OF SKIN: ICD-10-CM

## 2025-08-22 DIAGNOSIS — L29.89 OTHER PRURITUS: ICD-10-CM

## 2025-08-22 DIAGNOSIS — M19.012 PRIMARY OSTEOARTHRITIS, LEFT SHOULDER: ICD-10-CM

## 2025-08-22 DIAGNOSIS — L29.9 PRURITUS, UNSPECIFIED: ICD-10-CM

## 2025-08-22 DIAGNOSIS — N40.1 BENIGN PROSTATIC HYPERPLASIA WITH LOWER URINARY TRACT SYMPMS: ICD-10-CM

## 2025-08-22 DIAGNOSIS — Z86.69 PERSONAL HISTORY OF OTHER DISEASES OF THE NERVOUS SYSTEM AND SENSE ORGANS: ICD-10-CM

## 2025-08-22 DIAGNOSIS — M18.11 UNILATERAL PRIMARY OSTEOARTHRITIS OF FIRST CARPOMETACARPAL JOINT, RIGHT HAND: ICD-10-CM

## 2025-08-22 DIAGNOSIS — M25.519 PAIN IN UNSPECIFIED SHOULDER: ICD-10-CM

## 2025-08-22 DIAGNOSIS — L98.9 DISORDER OF THE SKIN AND SUBCUTANEOUS TISSUE, UNSPECIFIED: ICD-10-CM

## 2025-08-22 DIAGNOSIS — Z86.2 PERSONAL HISTORY OF DISEASES OF THE BLOOD AND BLOOD-FORMING ORGANS AND CERTAIN DISORDERS INVOLVING THE IMMUNE MECHANISM: ICD-10-CM

## 2025-08-22 DIAGNOSIS — I10 ESSENTIAL (PRIMARY) HYPERTENSION: ICD-10-CM

## 2025-08-22 DIAGNOSIS — M76.02 GLUTEAL TENDINITIS, LEFT HIP: ICD-10-CM

## 2025-08-22 DIAGNOSIS — R21 RASH AND OTHER NONSPECIFIC SKIN ERUPTION: ICD-10-CM

## 2025-08-22 DIAGNOSIS — R00.1 BRADYCARDIA, UNSPECIFIED: ICD-10-CM

## 2025-08-22 DIAGNOSIS — L20.89 OTHER ATOPIC DERMATITIS: ICD-10-CM

## 2025-08-22 DIAGNOSIS — R39.9 UNSPECIFIED SYMPTOMS AND SIGNS INVOLVING THE GENITOURINARY SYSTEM: ICD-10-CM

## 2025-08-22 DIAGNOSIS — K86.2 CYST OF PANCREAS: ICD-10-CM

## 2025-08-22 DIAGNOSIS — R20.2 PARESTHESIA OF SKIN: ICD-10-CM

## 2025-08-22 PROCEDURE — G0439: CPT | Mod: GC

## 2025-08-22 PROCEDURE — 99214 OFFICE O/P EST MOD 30 MIN: CPT

## 2025-08-22 PROCEDURE — 36415 COLL VENOUS BLD VENIPUNCTURE: CPT | Mod: GC

## 2025-08-22 RX ORDER — PANCRELIPASE 60000; 12000; 38000 [USP'U]/1; [USP'U]/1; [USP'U]/1
12000-38000 CAPSULE, DELAYED RELEASE PELLETS ORAL
Refills: 0 | Status: ACTIVE | COMMUNITY
Start: 2025-08-22

## 2025-08-24 LAB
25(OH)D3 SERPL-MCNC: 63.8 NG/ML
ALBUMIN SERPL ELPH-MCNC: 4.7 G/DL
ALBUMIN, RANDOM URINE: 1.5 MG/DL
ALP BLD-CCNC: 67 U/L
ALT SERPL-CCNC: 31 U/L
ANION GAP SERPL CALC-SCNC: 14 MMOL/L
AST SERPL-CCNC: 41 U/L
BASOPHILS # BLD AUTO: 0.02 K/UL
BASOPHILS NFR BLD AUTO: 0.3 %
BILIRUB SERPL-MCNC: 0.4 MG/DL
BUN SERPL-MCNC: 14 MG/DL
CALCIUM SERPL-MCNC: 9.9 MG/DL
CHLORIDE SERPL-SCNC: 105 MMOL/L
CO2 SERPL-SCNC: 24 MMOL/L
CREAT SERPL-MCNC: 0.7 MG/DL
CREAT SPEC-SCNC: 34 MG/DL
EGFRCR SERPLBLD CKD-EPI 2021: 90 ML/MIN/1.73M2
EOSINOPHIL # BLD AUTO: 0.13 K/UL
EOSINOPHIL NFR BLD AUTO: 2.1 %
ESTIMATED AVERAGE GLUCOSE: 111 MG/DL
GLUCOSE SERPL-MCNC: 114 MG/DL
HBA1C MFR BLD HPLC: 5.5 %
HCT VFR BLD CALC: 44.9 %
HGB BLD-MCNC: 13.4 G/DL
IMM GRANULOCYTES NFR BLD AUTO: 0.2 %
LYMPHOCYTES # BLD AUTO: 1.13 K/UL
LYMPHOCYTES NFR BLD AUTO: 18.7 %
MAN DIFF?: NORMAL
MCHC RBC-ENTMCNC: 29.8 G/DL
MCHC RBC-ENTMCNC: 30.5 PG
MCV RBC AUTO: 102 FL
MICROALBUMIN/CREAT 24H UR-RTO: 45 MG/G
MONOCYTES # BLD AUTO: 0.41 K/UL
MONOCYTES NFR BLD AUTO: 6.8 %
NEUTROPHILS # BLD AUTO: 4.35 K/UL
NEUTROPHILS NFR BLD AUTO: 71.9 %
PLATELET # BLD AUTO: 159 K/UL
POTASSIUM SERPL-SCNC: 5.1 MMOL/L
PROT SERPL-MCNC: 6.9 G/DL
RBC # BLD: 4.4 M/UL
RBC # FLD: 15.4 %
SODIUM SERPL-SCNC: 142 MMOL/L
WBC # FLD AUTO: 6.05 K/UL

## 2025-08-25 DIAGNOSIS — D75.89 OTHER SPECIFIED DISEASES OF BLOOD AND BLOOD-FORMING ORGANS: ICD-10-CM

## 2025-08-26 ENCOUNTER — TRANSCRIPTION ENCOUNTER (OUTPATIENT)
Age: 85
End: 2025-08-26

## 2025-08-26 LAB
TSH SERPL-ACNC: 3.62 UIU/ML
VIT B12 SERPL-MCNC: >2000 PG/ML

## 2025-08-29 ENCOUNTER — NON-APPOINTMENT (OUTPATIENT)
Age: 85
End: 2025-08-29

## 2025-09-12 ENCOUNTER — APPOINTMENT (OUTPATIENT)
Dept: DERMATOLOGY | Facility: CLINIC | Age: 85
End: 2025-09-12
Payer: MEDICARE

## 2025-09-12 VITALS — BODY MASS INDEX: 24.05 KG/M2 | HEIGHT: 70 IN | WEIGHT: 168 LBS

## 2025-09-12 DIAGNOSIS — L21.9 SEBORRHEIC DERMATITIS, UNSPECIFIED: ICD-10-CM

## 2025-09-12 DIAGNOSIS — L29.9 PRURITUS, UNSPECIFIED: ICD-10-CM

## 2025-09-12 DIAGNOSIS — L57.0 ACTINIC KERATOSIS: ICD-10-CM

## 2025-09-12 PROCEDURE — 17003 DESTRUCT PREMALG LES 2-14: CPT

## 2025-09-12 PROCEDURE — 99214 OFFICE O/P EST MOD 30 MIN: CPT | Mod: 25

## 2025-09-12 PROCEDURE — 17000 DESTRUCT PREMALG LESION: CPT

## 2025-09-12 RX ORDER — KETOCONAZOLE 20 MG/G
2 CREAM TOPICAL
Qty: 1 | Refills: 2 | Status: ACTIVE | COMMUNITY
Start: 2025-09-12 | End: 1900-01-01

## 2025-09-12 RX ORDER — HYDROCORTISONE 25 MG/G
2.5 OINTMENT TOPICAL
Qty: 1 | Refills: 6 | Status: ACTIVE | COMMUNITY
Start: 2025-09-12 | End: 1900-01-01